# Patient Record
Sex: MALE | Race: WHITE | NOT HISPANIC OR LATINO | Employment: OTHER | URBAN - METROPOLITAN AREA
[De-identification: names, ages, dates, MRNs, and addresses within clinical notes are randomized per-mention and may not be internally consistent; named-entity substitution may affect disease eponyms.]

---

## 2017-03-30 DIAGNOSIS — Z87.898 HISTORY OF ELEVATED PSA: Primary | ICD-10-CM

## 2017-03-31 ENCOUNTER — OFFICE VISIT (OUTPATIENT)
Dept: UROLOGY | Facility: CLINIC | Age: 77
End: 2017-03-31
Payer: COMMERCIAL

## 2017-03-31 VITALS
HEART RATE: 60 BPM | WEIGHT: 203 LBS | HEIGHT: 71 IN | SYSTOLIC BLOOD PRESSURE: 120 MMHG | DIASTOLIC BLOOD PRESSURE: 72 MMHG | BODY MASS INDEX: 28.42 KG/M2

## 2017-03-31 DIAGNOSIS — Z87.898 HISTORY OF ELEVATED PSA: ICD-10-CM

## 2017-03-31 PROCEDURE — 99212 OFFICE O/P EST SF 10 MIN: CPT | Performed by: UROLOGY

## 2017-03-31 ASSESSMENT — PAIN SCALES - GENERAL: PAINLEVEL: NO PAIN (0)

## 2017-03-31 NOTE — NURSING NOTE
Chief Complaint   Patient presents with     Annual Prostate Exam     Patient is here for annual exam     Josesito Toure LPN 10:10 AM March 31, 2017

## 2017-03-31 NOTE — PROGRESS NOTES
Brandan is a 76-year-old male with BPH, no voiding discomfort, dysuria or hematuria. She he does have intermittent hematospermia, as in the past. KUB and MRI of the prostate were normal in 2014. Patient has a 90 cc prostate gland  No family history of prostate cancer  Other past medical history: Gout, hypertension  Medications: Baby aspirin, lisinopril, Maxzide  Allergies: Bacitracin, clindamycin, soap, triamcinolone  Review of systems: As above  Exam: Normal appearance, normal vital signs, alert and oriented, normocephalic, normal respirations, neuro grossly intact. Normal sphincter tone, no rectal mass or impaction, large and benign feeling prostate, seminal vesicles not palpable  Urinalysis: Patient could not leave sample  Assessment: BPH, chronic intermittent hematospermia  Plan: See me yearly for urinalysis, LEYDA. No PSA unless palpable abnormality. See me sooner if hematuria

## 2017-03-31 NOTE — LETTER
3/31/2017       RE: Brandan Evans  [address] [place] [address]     Dear Colleague,    Thank you for referring your patient, Brandan Evans, to the [facility] [place] at [facility]. Please see a copy of my visit note below.    Brandan is a 76-year-old male with BPH, no voiding discomfort, dysuria or hematuria. She he does have intermittent hematospermia, as in the past. KUB and MRI of the prostate were normal in 2014. Patient has a 90 cc prostate gland  No family history of prostate cancer  Other past medical history: Gout, hypertension  Medications: Baby aspirin, lisinopril, Maxzide  Allergies: Bacitracin, clindamycin, soap, triamcinolone  Review of systems: As above  Exam: Normal appearance, normal vital signs, alert and oriented, normocephalic, normal respirations, neuro grossly intact. Normal sphincter tone, no rectal mass or impaction, large and benign feeling prostate, seminal vesicles not palpable  Urinalysis: Patient could not leave sample  Assessment: BPH, chronic intermittent hematospermia  Plan: See me yearly for urinalysis, LEYDA. No PSA unless palpable abnormality. See me sooner if hematuria    Again, thank you for allowing me to participate in the care of your patient.      Sincerely,    Harshad Baumann MD

## 2017-03-31 NOTE — MR AVS SNAPSHOT
"              After Visit Summary   3/31/2017    Brandan Evans    MRN: [id number]           Patient Information     Date Of Birth          1940        Visit Information        Provider Department      3/31/2017 10:00 AM Harshad Baumann MD [facility] [place]        Today's Diagnoses     History of elevated PSA           Follow-ups after your visit        Follow-up notes from your care team     Return in about 1 year (around 3/31/2018) for Physical Exam.      Who to contact     If you have questions or need follow up information about today's clinic visit or your schedule please contact [facility] [place] directly at [phone].  Normal or non-critical lab and imaging results will be communicated to you by MyChart, letter or phone within 4 business days after the clinic has received the results. If you do not hear from us within 7 days, please contact the clinic through Nervana Systemshart or phone. If you have a critical or abnormal lab result, we will notify you by phone as soon as possible.  Submit refill requests through [employer] or call your pharmacy and they will forward the refill request to us. Please allow 3 business days for your refill to be completed.          Additional Information About Your Visit        MyChart Information     [employer] gives you secure access to your electronic health record. If you see a primary care provider, you can also send messages to your care team and make appointments. If you have questions, please call your primary care clinic.  If you do not have a primary care provider, please call [phone] and they will assist you.        Care EveryWhere ID     This is your Care EveryWhere ID. This could be used by other organizations to access your [place] medical records  [id number]        Your Vitals Were     Pulse Height BMI (Body Mass Index)             60 1.803 m (5' 11\") 28.31 kg/m2          Blood Pressure " from Last 3 Encounters:   03/31/17 120/72   08/18/15 (!) 168/92   01/15/14 124/77    Weight from Last 3 Encounters:   03/31/17 92.1 kg (203 lb)   01/15/14 91.6 kg (202 lb)   05/23/12 91.6 kg (201 lb 15.1 oz)              We Performed the Following     UA without Microscopic          Today's Medication Changes          These changes are accurate as of: 3/31/17 10:24 AM.  If you have any questions, ask your nurse or doctor.               Stop taking these medicines if you haven't already. Please contact your care team if you have questions.     INDOCIN PO   Stopped by:  Harshad Baumann MD           ketorolac 10 MG tablet   Commonly known as:  TORADOL   Stopped by:  Harshad Baumann MD           ondansetron 4 MG ODT tab   Commonly known as:  ZOFRAN ODT   Stopped by:  Harshad Baumann MD           oxyCODONE 5 MG IR tablet   Commonly known as:  ROXICODONE   Stopped by:  Harshad Baumann MD                    Primary Care Provider Office Phone # Fax #    Abad Anderson [phone]       [facility] [place] [address] [address]        Thank you!     Thank you for choosing [facility] [place]  for your care. Our goal is always to provide you with excellent care. Hearing back from our patients is one way we can continue to improve our services. Please take a few minutes to complete the written survey that you may receive in the mail after your visit with us. Thank you!             Your Updated Medication List - Protect others around you: Learn how to safely use, store and throw away your medicines at www.disposemymeds.org.          This list is accurate as of: 3/31/17 10:24 AM.  Always use your most recent med list.                   Brand Name Dispense Instructions for use    aspirin 81 MG tablet      Take  by mouth daily.       lisinopril 5 MG tablet    PRINIVIL/ZESTRIL    30 tablet    Take 1 tablet (5 mg) by mouth daily        triamterene-hydrochlorothiazide 37.5-25 MG per tablet    MAXZIDE-25     Take 1 tablet by mouth daily

## 2018-05-22 ENCOUNTER — SURGERY (OUTPATIENT)
Age: 78
End: 2018-05-22

## 2018-05-22 ENCOUNTER — HOSPITAL ENCOUNTER (OUTPATIENT)
Facility: CLINIC | Age: 78
Discharge: HOME | End: 2018-05-22
Attending: COLON & RECTAL SURGERY | Admitting: COLON & RECTAL SURGERY
Payer: MEDICARE

## 2018-05-22 VITALS
SYSTOLIC BLOOD PRESSURE: 110 MMHG | RESPIRATION RATE: 18 BRPM | DIASTOLIC BLOOD PRESSURE: 74 MMHG | OXYGEN SATURATION: 93 %

## 2018-05-22 LAB — COLONOSCOPY: NORMAL

## 2018-05-22 PROCEDURE — 88305 TISSUE EXAM BY PATHOLOGIST: CPT | Mod: 26 | Performed by: COLON & RECTAL SURGERY

## 2018-05-22 PROCEDURE — 45380 COLONOSCOPY AND BIOPSY: CPT | Performed by: COLON & RECTAL SURGERY

## 2018-05-22 PROCEDURE — 25000128 H RX IP 250 OP 636: Performed by: COLON & RECTAL SURGERY

## 2018-05-22 PROCEDURE — G0500 MOD SEDAT ENDO SERVICE >5YRS: HCPCS | Performed by: COLON & RECTAL SURGERY

## 2018-05-22 PROCEDURE — 88305 TISSUE EXAM BY PATHOLOGIST: CPT | Performed by: COLON & RECTAL SURGERY

## 2018-05-22 RX ORDER — ONDANSETRON 2 MG/ML
4 INJECTION INTRAMUSCULAR; INTRAVENOUS EVERY 6 HOURS PRN
Status: CANCELLED | OUTPATIENT
Start: 2018-05-22

## 2018-05-22 RX ORDER — FENTANYL CITRATE 50 UG/ML
INJECTION, SOLUTION INTRAMUSCULAR; INTRAVENOUS PRN
Status: DISCONTINUED | OUTPATIENT
Start: 2018-05-22 | End: 2018-05-22 | Stop reason: HOSPADM

## 2018-05-22 RX ORDER — NALOXONE HYDROCHLORIDE 0.4 MG/ML
.1-.4 INJECTION, SOLUTION INTRAMUSCULAR; INTRAVENOUS; SUBCUTANEOUS
Status: CANCELLED | OUTPATIENT
Start: 2018-05-22 | End: 2018-05-23

## 2018-05-22 RX ORDER — ONDANSETRON 2 MG/ML
4 INJECTION INTRAMUSCULAR; INTRAVENOUS
Status: DISCONTINUED | OUTPATIENT
Start: 2018-05-22 | End: 2018-05-22 | Stop reason: HOSPADM

## 2018-05-22 RX ORDER — ONDANSETRON 4 MG/1
4 TABLET, ORALLY DISINTEGRATING ORAL EVERY 6 HOURS PRN
Status: CANCELLED | OUTPATIENT
Start: 2018-05-22

## 2018-05-22 RX ORDER — LIDOCAINE 40 MG/G
CREAM TOPICAL
Status: DISCONTINUED | OUTPATIENT
Start: 2018-05-22 | End: 2018-05-22 | Stop reason: HOSPADM

## 2018-05-22 RX ORDER — FLUMAZENIL 0.1 MG/ML
0.2 INJECTION, SOLUTION INTRAVENOUS
Status: CANCELLED | OUTPATIENT
Start: 2018-05-22 | End: 2018-05-23

## 2018-05-22 RX ADMIN — FENTANYL CITRATE 100 MCG: 50 INJECTION, SOLUTION INTRAMUSCULAR; INTRAVENOUS at 13:56

## 2018-05-22 RX ADMIN — MIDAZOLAM 2 MG: 1 INJECTION INTRAMUSCULAR; INTRAVENOUS at 13:56

## 2018-05-22 NOTE — H&P
[facility]    History and Physical  Colon and Rectal Surgery     Date of Admission:  5/22/2018      Assessment & Plan   Brandan Evans is a 77 year old male who presents for colonoscopy.    Indication: screening  Plan for Colonoscopy with possible biopsy, possible polypectomy. We discussed the risks, benefits and alternatives and the patient wished to proceed.    The above has been forwarded to the consulting provider.      Hugh Pitt MD  [facility]  [phone]        Code Status   Full Code    Primary Care Physician   Abad Anedrson      History is obtained from the patient    History of Present Illness   Brandan Evans is a 77 year old male who presents for screening    Past Medical History    I have reviewed this patient's medical history and updated it with pertinent information if needed.   Past Medical History:   Diagnosis Date     Arrhythmia      Blood in semen      Complication of anesthesia [id number]    Patient said that jis pulse rate dropped during a surgical procedure     Decreased hearing     right ear.     Gout      Hypertension      Irregular heart beat      Renal disease     kidney stones       Past Surgical History   I have reviewed this patient's surgical history and updated it with pertinent information if needed.  Past Surgical History:   Procedure Laterality Date     CHOLECYSTECTOMY       COLONOSCOPY       EYE SURGERY      bilat catartact     LASER DIODE STAPEDECTOMY  4/11/2012    Procedure:LASER DIODE STAPEDECTOMY; Attempted Diode Laser Right Stapedectomy with laser standby **latex safe ; Surgeon:MECCA DAMON; Location:[facility] OR     LASER DIODE STAPEDECTOMY  5/23/2012    Procedure:LASER DIODE STAPEDECTOMY; Right Stapedectomy with Diode Laser  Latex Safe; Surgeon:MECCA DAMON; Location:[facility] OR       Prior to Admission Medications   Prior to Admission Medications   Prescriptions Last Dose Informant Patient  "Reported? Taking?   aspirin 81 MG tablet 5/22/2018  Yes Yes   Sig: Take  by mouth daily.   triamterene-hydrochlorothiazide (MAXZIDE-25) 37.5-25 MG per tablet 5/22/2018  Yes Yes   Sig: Take 1 tablet by mouth daily      Facility-Administered Medications: None     Allergies   Allergies   Allergen Reactions     Bacitracin      Other reaction(s): Contact Dermatitis     Clindamycin      Other reaction(s): GI Upset     Soap Itching     Other reaction(s): Contact Dermatitis  blisters and itching from \"technicare soap\" used for cleansing LEs 10/2006.     Triamcinolone      Can not use cream  Caused reddening       Social History   I have reviewed this patient's social history and updated it with pertinent information if needed. Brandan PELAEZ Cristina  reports that he has never smoked. He has never used smokeless tobacco. He reports that he drinks alcohol. He reports that he does not use illicit drugs.    Family History   I have reviewed this patient's family history and updated it with pertinent information if needed.   History reviewed. No pertinent family history.    Review of Systems   CONSTITUTIONAL: NEGATIVE for fever, chills, change in weight  ENT/MOUTH: NEGATIVE for ear, mouth and throat problems  RESP: NEGATIVE for significant cough or SOB  CV: NEGATIVE for chest pain, palpitations or peripheral edema    Physical Exam                      Vital Signs with Ranges     0 lbs 0 oz    Constitutional: awake, alert, cooperative, no apparent distress, and appears stated age  AIRWAY EXAM: Mallampatti Class I (visualization of the soft palate, fauces, uvula, anterior and posterior pillars)  Respiratory: No increased work of breathing, good air exchange, clear to auscultation bilaterally, no crackles or wheezing  Cardiovascular: Normal apical impulse, regular rate and rhythm, normal S1 and S2, no S3 or S4, and no murmur noted  ASA Class: 2 - Mild systemic disease                "

## 2018-05-22 NOTE — BRIEF OP NOTE
[facility] Brief Operative Note    Pre-operative diagnosis: SCREEN   Post-operative diagnosis small rectal polyp   Procedure: Procedure(s):  colonoscopy - Wound Class: II-Clean Contaminated   Surgeon(s): Surgeon(s) and Role:     * Hugh Pitt MD - Primary   Estimated blood loss: * No values recorded between 5/22/2018 12:00 AM and 5/22/2018  2:10 PM *    Specimens:   ID Type Source Tests Collected by Time Destination   A : rectal polyp biopsy Biopsy Large Intestine, Rectum SURGICAL PATHOLOGY EXAM Hugh Pitt MD 5/22/2018  2:09 PM       Findings: small rectal polyp  See provation procedure note in chart review.    Hugh Pitt MD  [facility]  [phone]

## 2018-05-23 LAB — COPATH REPORT: NORMAL

## 2018-10-18 DIAGNOSIS — R97.20 ELEVATED PROSTATE SPECIFIC ANTIGEN (PSA): Primary | ICD-10-CM

## 2018-10-24 ENCOUNTER — OFFICE VISIT (OUTPATIENT)
Dept: UROLOGY | Facility: CLINIC | Age: 78
End: 2018-10-24
Payer: COMMERCIAL

## 2018-10-24 VITALS
SYSTOLIC BLOOD PRESSURE: 110 MMHG | BODY MASS INDEX: 29.12 KG/M2 | HEIGHT: 71 IN | DIASTOLIC BLOOD PRESSURE: 74 MMHG | WEIGHT: 208 LBS

## 2018-10-24 DIAGNOSIS — R97.20 ELEVATED PROSTATE SPECIFIC ANTIGEN (PSA): ICD-10-CM

## 2018-10-24 PROCEDURE — 81003 URINALYSIS AUTO W/O SCOPE: CPT | Performed by: UROLOGY

## 2018-10-24 PROCEDURE — 99213 OFFICE O/P EST LOW 20 MIN: CPT | Performed by: UROLOGY

## 2018-10-24 ASSESSMENT — PAIN SCALES - GENERAL: PAINLEVEL: NO PAIN (0)

## 2018-10-24 NOTE — NURSING NOTE
Chief Complaint   Patient presents with     Hx of BPH     Pt is here for yearly urinalysis.      Ally Hdez, LUIS

## 2018-10-24 NOTE — MR AVS SNAPSHOT
"              After Visit Summary   10/24/2018    Brandan Evans    MRN: [id number]           Patient Information     Date Of Birth          1940        Visit Information        Provider Department      10/24/2018 1:00 PM Harshad Baumann MD [facility] [place]        Today's Diagnoses     Elevated prostate specific antigen (PSA)           Follow-ups after your visit        Follow-up notes from your care team     Return in about 1 year (around 10/24/2019) for Physical Exam.      Who to contact     If you have questions or need follow up information about today's clinic visit or your schedule please contact [facility] directly at [phone].  Normal or non-critical lab and imaging results will be communicated to you by InnerRewardshart, letter or phone within 4 business days after the clinic has received the results. If you do not hear from us within 7 days, please contact the clinic through InnerRewardshart or phone. If you have a critical or abnormal lab result, we will notify you by phone as soon as possible.  Submit refill requests through [employer] or call your pharmacy and they will forward the refill request to us. Please allow 3 business days for your refill to be completed.          Additional Information About Your Visit        MyChart Information     [employer] gives you secure access to your electronic health record. If you see a primary care provider, you can also send messages to your care team and make appointments. If you have questions, please call your primary care clinic.  If you do not have a primary care provider, please call [phone] and they will assist you.        Care EveryWhere ID     This is your Care EveryWhere ID. This could be used by other organizations to access your [place] medical records  [id number]        Your Vitals Were     Height BMI (Body Mass Index)                1.791 m (5' 10.5\") 29.42 kg/m2           " Blood Pressure from Last 3 Encounters:   10/24/18 110/74   05/22/18 110/74   03/31/17 120/72    Weight from Last 3 Encounters:   10/24/18 94.3 kg (208 lb)   03/31/17 92.1 kg (203 lb)   01/15/14 91.6 kg (202 lb)              We Performed the Following     UA without Microscopic        Primary Care Provider Office Phone # Fax #    Abad Anderson [phone]       [facility] [address] [facility] [address]        Equal Access to Services     [facility]: Hadii aad ku hadasho Soomaali, waaxda luqadaha, qaybta kaalmada adeegyada, [place]in [place] . So [facility] [phone].    ATENCIÓN: Si habla español, tiene a young disposición servicios gratuitos de asistencia lingüística. [facility] [phone].    We comply with applicable federal civil rights laws and [place] laws. We do not discriminate on the basis of race, color, national origin, age, disability, sex, sexual orientation, or gender identity.            Thank you!     Thank you for choosing [facility] [place]  for your care. Our goal is always to provide you with excellent care. Hearing back from our patients is one way we can continue to improve our services. Please take a few minutes to complete the written survey that you may receive in the mail after your visit with us. Thank you!             Your Updated Medication List - Protect others around you: Learn how to safely use, store and throw away your medicines at www.disposemymeds.org.          This list is accurate as of 10/24/18  1:32 PM.  Always use your most recent med list.                   Brand Name Dispense Instructions for use Diagnosis    aspirin 81 MG tablet      Take  by mouth daily.        triamterene-hydrochlorothiazide 37.5-25 MG per tablet    MAXZIDE-25     Take 1 tablet by mouth daily

## 2018-10-24 NOTE — PROGRESS NOTES
Brandan Evans is a 77-year-old gentleman who is accompanied by his wife today. He has a history of BPH and hematospermia. He has a normal urinalysis today and claims he is voiding well, no dysuria, hematuria or hematospermia.  Other past medical history: Recent cellulitis, gout, history of arrhythmia, hearing loss, hypertension, laser stapedectomy, eye surgery, cholecystectomy, colonoscopy, nonsmoker  No family history of prostate cancer  Medications: Low-dose aspirin, Maxzide  Allergies: Bacitracin, clindamycin, soap, triamcinolone  Exam: Alert and oriented, normal appearance, normal vital signs. Normocephalic, normal respirations, neuro grossly intact. Normal sphincter tone, no rectal mass or impaction, benign feeling prostate, normal seminal vesicles  Assessment: BPH, history of hematospermia, resolving cellulitis  Plan: See me yearly for urinalysis, postvoid residual, LEYDA. No PSA unless palpable abnormality

## 2018-10-26 LAB
ALBUMIN UR-MCNC: NEGATIVE MG/DL
APPEARANCE UR: CLEAR
BILIRUB UR QL STRIP: NEGATIVE
COLOR UR AUTO: YELLOW
GLUCOSE UR STRIP-MCNC: NEGATIVE MG/DL
HGB UR QL STRIP: NEGATIVE
KETONES UR STRIP-MCNC: NEGATIVE MG/DL
LEUKOCYTE ESTERASE UR QL STRIP: NEGATIVE
NITRATE UR QL: NEGATIVE
PH UR STRIP: 6 PH (ref 5–7)
SOURCE: NORMAL
SP GR UR STRIP: 1.01 (ref 1–1.03)
UROBILINOGEN UR STRIP-ACNC: 1 EU/DL (ref 0.2–1)

## 2019-06-14 ENCOUNTER — OFFICE VISIT (OUTPATIENT)
Dept: UROLOGY | Facility: CLINIC | Age: 79
End: 2019-06-14
Payer: COMMERCIAL

## 2019-06-14 VITALS
HEART RATE: 60 BPM | HEIGHT: 71 IN | OXYGEN SATURATION: 97 % | BODY MASS INDEX: 28.7 KG/M2 | SYSTOLIC BLOOD PRESSURE: 132 MMHG | WEIGHT: 205 LBS | DIASTOLIC BLOOD PRESSURE: 78 MMHG

## 2019-06-14 DIAGNOSIS — N42.81 PROSTATE PAIN: Primary | ICD-10-CM

## 2019-06-14 LAB
ALBUMIN UR-MCNC: NEGATIVE MG/DL
APPEARANCE UR: CLEAR
BILIRUB UR QL STRIP: NEGATIVE
COLOR UR AUTO: YELLOW
GLUCOSE UR STRIP-MCNC: NEGATIVE MG/DL
HGB UR QL STRIP: NEGATIVE
KETONES UR STRIP-MCNC: NEGATIVE MG/DL
LEUKOCYTE ESTERASE UR QL STRIP: NEGATIVE
NITRATE UR QL: NEGATIVE
PH UR STRIP: 5.5 PH (ref 5–7)
SOURCE: NORMAL
SP GR UR STRIP: 1.01 (ref 1–1.03)
UROBILINOGEN UR STRIP-ACNC: 0.2 EU/DL (ref 0.2–1)

## 2019-06-14 PROCEDURE — 81003 URINALYSIS AUTO W/O SCOPE: CPT | Performed by: UROLOGY

## 2019-06-14 PROCEDURE — 99213 OFFICE O/P EST LOW 20 MIN: CPT | Performed by: UROLOGY

## 2019-06-14 ASSESSMENT — MIFFLIN-ST. JEOR: SCORE: 1667

## 2019-06-14 ASSESSMENT — PAIN SCALES - GENERAL: PAINLEVEL: NO PAIN (0)

## 2019-06-14 NOTE — PROGRESS NOTES
Brnadan is a 78-year-old male with BPH and a history of hematospermia.  He has complaints of intermittent, mild bilateral groin discomfort.  Last night he had some pain above his right hip.  He has had no hematuria or dysuria.  He has a normal urinalysis and a 10 cc postvoid residual.  He has had no fever, chills, nausea or vomiting.  His bowels are moving regularly and he has had no blood in the stool or semen  Other past medical history: Arrhythmia, hearing loss, gout, hypertension, history of kidney stones, non-smoker  No family history of prostate cancer  Surgeries reviewed  Medications: Baby aspirin, Maxide  Allergies: Unchanged  Review of systems: As above  Exam: Alert and oriented, with spouse.  Normal vital signs, normal appearance.  Normal respirations, neuro grossly intact.  Small right inguinal hernia, no inguinal adenopathy.  Normal phallus, no balanitis, normal urethral meatus, normal scrotal skin, normal testes without masses or tenderness, normal epididymis and spermatic cords.  Normal sphincter tone, no rectal mass or impaction, benign prostate, normal seminal vesicles  Assessment: Normal exam except for small right inguinal hernia.  Reassured  Plan: See me yearly with urinalysis and digital rectal exam in June.  He will discuss symptoms with Dr. Anderson in the future and  may need referral to a general surgeon for his hernia

## 2019-06-14 NOTE — NURSING NOTE
"Chief Complaint   Patient presents with     Prostate Pain     pt states he has pn in low abd and pevis through to flank. wants to est. care here.       Patient Active Problem List   Diagnosis     Otosclerosis     Otosclerosis involving round window       Allergies   Allergen Reactions     Bacitracin      Other reaction(s): Contact Dermatitis     Clindamycin      Other reaction(s): GI Upset     Soap Itching     Other reaction(s): Contact Dermatitis  blisters and itching from \"technicare soap\" used for cleansing LEs 10/2006.     Triamcinolone      Can not use cream  Caused reddening       /78   Pulse 60   Ht 1.803 m (5' 11\")   Wt 93 kg (205 lb)   SpO2 97%   BMI 28.59 kg/m          Gilson Mcdaniel, EMT-B  6/14/2019         "

## 2019-06-14 NOTE — LETTER
6/14/2019       RE: Brandan Evans  [address] [place] [facility] [facility] [place] [address]     Dear Colleague,    Thank you for referring your patient, Brandan Evans, to the [facility] [place] at [facility]. Please see a copy of my visit note below.    Brandan is a 78-year-old male with BPH and a history of hematospermia.  He has complaints of intermittent, mild bilateral groin discomfort.  Last night he had some pain above his right hip.  He has had no hematuria or dysuria.  He has a normal urinalysis and a 10 cc postvoid residual.  He has had no fever, chills, nausea or vomiting.  His bowels are moving regularly and he has had no blood in the stool or semen  Other past medical history: Arrhythmia, hearing loss, gout, hypertension, history of kidney stones, non-smoker  No family history of prostate cancer  Surgeries reviewed  Medications: Baby aspirin, Maxide  Allergies: Unchanged  Review of systems: As above  Exam: Alert and oriented, with spouse.  Normal vital signs, normal appearance.  Normal respirations, neuro grossly intact.  Small right inguinal hernia, no inguinal adenopathy.  Normal phallus, no balanitis, normal urethral meatus, normal scrotal skin, normal testes without masses or tenderness, normal epididymis and spermatic cords.  Normal sphincter tone, no rectal mass or impaction, benign prostate, normal seminal vesicles  Assessment: Normal exam except for small right inguinal hernia.  Reassured  Plan: See me yearly with urinalysis and digital rectal exam in June.  He will discuss symptoms with Dr. Anderson in the future and  may need referral to a general surgeon for his hernia    Again, thank you for allowing me to participate in the care of your patient.      Sincerely,    Harshad Baumann MD

## 2019-06-17 ENCOUNTER — MYC MEDICAL ADVICE (OUTPATIENT)
Dept: UROLOGY | Facility: CLINIC | Age: 79
End: 2019-06-17

## 2019-06-18 NOTE — NURSING NOTE
Kamini from the business office has called him, new insurance cards were given to us and it will be resubmitted to his insurance.  Tommy SHEPHERD-[facility]  June 18, 2019 11:14 AM

## 2019-08-06 NOTE — LETTER
10/24/2018       RE: Brandan Evans  [address] [place] [address]     Dear Colleague,    Thank you for referring your patient, Brandan Evans, to the [facility] [place] at [facility]. Please see a copy of my visit note below.    Brandan Evans is a 77-year-old gentleman who is accompanied by his wife today. He has a history of BPH and hematospermia. He has a normal urinalysis today and claims he is voiding well, no dysuria, hematuria or hematospermia.  Other past medical history: Recent cellulitis, gout, history of arrhythmia, hearing loss, hypertension, laser stapedectomy, eye surgery, cholecystectomy, colonoscopy, nonsmoker  No family history of prostate cancer  Medications: Low-dose aspirin, Maxzide  Allergies: Bacitracin, clindamycin, soap, triamcinolone  Exam: Alert and oriented, normal appearance, normal vital signs. Normocephalic, normal respirations, neuro grossly intact. Normal sphincter tone, no rectal mass or impaction, benign feeling prostate, normal seminal vesicles  Assessment: BPH, history of hematospermia, resolving cellulitis  Plan: See me yearly for urinalysis, postvoid residual, LEYDA. No PSA unless palpable abnormality    Again, thank you for allowing me to participate in the care of your patient.      Sincerely,    Harshad Baumann MD       Birth Control Pills Pregnancy And Lactation Text: This medication should be avoided if pregnant and for the first 30 days post-partum.

## 2019-10-02 ENCOUNTER — HEALTH MAINTENANCE LETTER (OUTPATIENT)
Age: 79
End: 2019-10-02

## 2019-12-15 ENCOUNTER — HEALTH MAINTENANCE LETTER (OUTPATIENT)
Age: 79
End: 2019-12-15

## 2021-01-15 ENCOUNTER — HEALTH MAINTENANCE LETTER (OUTPATIENT)
Age: 81
End: 2021-01-15

## 2021-04-22 DIAGNOSIS — N42.81 PROSTATE PAIN: Primary | ICD-10-CM

## 2021-04-23 ENCOUNTER — OFFICE VISIT (OUTPATIENT)
Dept: UROLOGY | Facility: CLINIC | Age: 81
End: 2021-04-23
Payer: COMMERCIAL

## 2021-04-23 VITALS
DIASTOLIC BLOOD PRESSURE: 70 MMHG | BODY MASS INDEX: 26.68 KG/M2 | HEIGHT: 72 IN | OXYGEN SATURATION: 95 % | HEART RATE: 62 BPM | WEIGHT: 197 LBS | SYSTOLIC BLOOD PRESSURE: 136 MMHG

## 2021-04-23 DIAGNOSIS — N42.81 PROSTATE PAIN: ICD-10-CM

## 2021-04-23 LAB
ALBUMIN UR-MCNC: NEGATIVE MG/DL
APPEARANCE UR: CLEAR
BILIRUB UR QL STRIP: NEGATIVE
COLOR UR AUTO: YELLOW
GLUCOSE UR STRIP-MCNC: NEGATIVE MG/DL
HGB UR QL STRIP: NEGATIVE
KETONES UR STRIP-MCNC: NEGATIVE MG/DL
LEUKOCYTE ESTERASE UR QL STRIP: NEGATIVE
NITRATE UR QL: NEGATIVE
PH UR STRIP: 6 PH (ref 5–7)
SOURCE: NORMAL
SP GR UR STRIP: 1.02 (ref 1–1.03)
UROBILINOGEN UR STRIP-ACNC: 0.2 EU/DL (ref 0.2–1)

## 2021-04-23 PROCEDURE — 81003 URINALYSIS AUTO W/O SCOPE: CPT | Performed by: UROLOGY

## 2021-04-23 PROCEDURE — 99212 OFFICE O/P EST SF 10 MIN: CPT | Performed by: UROLOGY

## 2021-04-23 ASSESSMENT — MIFFLIN-ST. JEOR: SCORE: 1628.65

## 2021-04-23 ASSESSMENT — PAIN SCALES - GENERAL: PAINLEVEL: NO PAIN (0)

## 2021-04-23 NOTE — PROGRESS NOTES
Mr. Evans is a very pleasant 80-year-old male.  He has had 2 general surgery opinions who suggested he wait to fix his right inguinal hernia.  His urination is comfortable and he has had no dysuria or hematuria.  There is no family history of prostate cancer  Other past medical history: Arrhythmia, blood in semen, hearing loss, gout, hypertension, history of urolithiasis, non-smoker  Surgeries reviewed  Medications: Baby aspirin, Maxide  Allergies: Bacitracin, clindamycin, soap, triamcinolone  Review of systems: As above.  Feels well  Exam: Alert and oriented, normal appearance, normal vital signs.  Normal respirations, neuro grossly intact.  Normal sphincter tone, no rectal mass or impaction, benign and symmetric prostate, normal seminal vesicles  Urinalysis normal  Assessment: BPH, right inguinal hernia  Plan: See partner yearly for digital rectal exam, urinalysis.  No PSA unless palpable abnormality

## 2021-04-23 NOTE — NURSING NOTE
"Chief Complaint   Patient presents with     Benign Prostatic Hypertrophy     Patient here today for a follow up       Blood pressure 136/70, pulse 62, height 1.816 m (5' 11.5\"), weight 89.4 kg (197 lb), SpO2 95 %. Body mass index is 27.09 kg/m .    Patient Active Problem List   Diagnosis     Otosclerosis     Otosclerosis involving round window       Allergies   Allergen Reactions     Bacitracin      Other reaction(s): Contact Dermatitis     Clindamycin      Other reaction(s): GI Upset     Soap Itching     Other reaction(s): Contact Dermatitis  blisters and itching from \"technicare soap\" used for cleansing LEs 10/2006.     Triamcinolone      Can not use cream  Caused reddening       Current Outpatient Medications   Medication Sig Dispense Refill     aspirin 81 MG tablet Take  by mouth daily.       triamterene-hydrochlorothiazide (MAXZIDE-25) 37.5-25 MG per tablet Take 1 tablet by mouth daily         Social History     Tobacco Use     Smoking status: Never Smoker     Smokeless tobacco: Never Used   Substance Use Topics     Alcohol use: Yes     Comment: Rare     Drug use: No       UA RESULTS:  Recent Labs   Lab Test 04/23/21  0838 01/15/14  0828 01/15/14  0828   COLOR Yellow   < > Yellow   APPEARANCE Clear   < > Clear   URINEGLC Negative   < > Negative   URINEBILI Negative   < > Negative   URINEKETONE Negative   < > 40*   SG 1.025   < > 1.016   UBLD Negative   < > Moderate*   URINEPH 6.0   < > 6.5   PROTEIN Negative   < > 10*   UROBILINOGEN 0.2   < >  --    NITRITE Negative   < > Negative   LEUKEST Negative   < > Negative   RBCU  --   --  51*   WBCU  --   --  1    < > = values in this interval not displayed.         Marian Ceron, [facility]  4/23/2021  8:57 AM       "

## 2021-04-23 NOTE — LETTER
4/23/2021       RE: Brandan Evans  [address] [place] [address]     Dear Colleague,    Thank you for referring your patient, Brandan Evans, to the [facility] at [facility]. Please see a copy of my visit note below.    Mr. Evans is a very pleasant 80-year-old male.  He has had 2 general surgery opinions who suggested he wait to fix his right inguinal hernia.  His urination is comfortable and he has had no dysuria or hematuria.  There is no family history of prostate cancer  Other past medical history: Arrhythmia, blood in semen, hearing loss, gout, hypertension, history of urolithiasis, non-smoker  Surgeries reviewed  Medications: Baby aspirin, Maxide  Allergies: Bacitracin, clindamycin, soap, triamcinolone  Review of systems: As above.  Feels well  Exam: Alert and oriented, normal appearance, normal vital signs.  Normal respirations, neuro grossly intact.  Normal sphincter tone, no rectal mass or impaction, benign and symmetric prostate, normal seminal vesicles  Urinalysis normal  Assessment: BPH, right inguinal hernia  Plan: See partner yearly for digital rectal exam, urinalysis.  No PSA unless palpable abnormality    Again, thank you for allowing me to participate in the care of your patient.      Sincerely,    Harshad Baumann MD

## 2021-08-20 ENCOUNTER — TRANSFERRED RECORDS (OUTPATIENT)
Dept: HEALTH INFORMATION MANAGEMENT | Facility: CLINIC | Age: 81
End: 2021-08-20

## 2021-08-22 ENCOUNTER — HOSPITAL ENCOUNTER (EMERGENCY)
Facility: CLINIC | Age: 81
Discharge: HOME | End: 2021-08-22
Attending: EMERGENCY MEDICINE | Admitting: EMERGENCY MEDICINE
Payer: COMMERCIAL

## 2021-08-22 VITALS
OXYGEN SATURATION: 96 % | HEART RATE: 61 BPM | WEIGHT: 206 LBS | SYSTOLIC BLOOD PRESSURE: 155 MMHG | BODY MASS INDEX: 28.33 KG/M2 | RESPIRATION RATE: 14 BRPM | DIASTOLIC BLOOD PRESSURE: 114 MMHG | TEMPERATURE: 97.9 F

## 2021-08-22 DIAGNOSIS — I10 HYPERTENSION, UNSPECIFIED TYPE: ICD-10-CM

## 2021-08-22 DIAGNOSIS — I48.91 ATRIAL FIBRILLATION, UNSPECIFIED TYPE (H): ICD-10-CM

## 2021-08-22 LAB
ALBUMIN SERPL-MCNC: 3.4 G/DL (ref 3.4–5)
ALP SERPL-CCNC: 164 U/L (ref 40–150)
ALT SERPL W P-5'-P-CCNC: 33 U/L (ref 0–70)
ANION GAP SERPL CALCULATED.3IONS-SCNC: 1 MMOL/L (ref 3–14)
AST SERPL W P-5'-P-CCNC: 21 U/L (ref 0–45)
BASOPHILS # BLD AUTO: 0.2 10E3/UL (ref 0–0.2)
BASOPHILS NFR BLD AUTO: 2 %
BILIRUB SERPL-MCNC: 0.5 MG/DL (ref 0.2–1.3)
BUN SERPL-MCNC: 14 MG/DL (ref 7–30)
CALCIUM SERPL-MCNC: 8.5 MG/DL (ref 8.5–10.1)
CHLORIDE BLD-SCNC: 106 MMOL/L (ref 94–109)
CO2 SERPL-SCNC: 31 MMOL/L (ref 20–32)
CREAT SERPL-MCNC: 0.96 MG/DL (ref 0.66–1.25)
EOSINOPHIL # BLD AUTO: 2.4 10E3/UL (ref 0–0.7)
EOSINOPHIL NFR BLD AUTO: 24 %
ERYTHROCYTE [DISTWIDTH] IN BLOOD BY AUTOMATED COUNT: 13.4 % (ref 10–15)
GFR SERPL CREATININE-BSD FRML MDRD: 74 ML/MIN/1.73M2
GLUCOSE BLD-MCNC: 102 MG/DL (ref 70–99)
HCT VFR BLD AUTO: 47.6 % (ref 40–53)
HGB BLD-MCNC: 15.3 G/DL (ref 13.3–17.7)
IMM GRANULOCYTES # BLD: 0 10E3/UL
IMM GRANULOCYTES NFR BLD: 0 %
LYMPHOCYTES # BLD AUTO: 1.2 10E3/UL (ref 0.8–5.3)
LYMPHOCYTES NFR BLD AUTO: 12 %
MCH RBC QN AUTO: 28.6 PG (ref 26.5–33)
MCHC RBC AUTO-ENTMCNC: 32.1 G/DL (ref 31.5–36.5)
MCV RBC AUTO: 89 FL (ref 78–100)
MONOCYTES # BLD AUTO: 0.9 10E3/UL (ref 0–1.3)
MONOCYTES NFR BLD AUTO: 9 %
NEUTROPHILS # BLD AUTO: 5.3 10E3/UL (ref 1.6–8.3)
NEUTROPHILS NFR BLD AUTO: 53 %
NRBC # BLD AUTO: 0 10E3/UL
NRBC BLD AUTO-RTO: 0 /100
NT-PROBNP SERPL-MCNC: 1271 PG/ML (ref 0–1800)
PLATELET # BLD AUTO: 235 10E3/UL (ref 150–450)
POTASSIUM BLD-SCNC: 3.8 MMOL/L (ref 3.4–5.3)
PROT SERPL-MCNC: 6.8 G/DL (ref 6.8–8.8)
RBC # BLD AUTO: 5.35 10E6/UL (ref 4.4–5.9)
SODIUM SERPL-SCNC: 138 MMOL/L (ref 133–144)
TROPONIN I SERPL-MCNC: <0.015 UG/L (ref 0–0.04)
WBC # BLD AUTO: 10 10E3/UL (ref 4–11)

## 2021-08-22 PROCEDURE — 99284 EMERGENCY DEPT VISIT MOD MDM: CPT

## 2021-08-22 PROCEDURE — 82040 ASSAY OF SERUM ALBUMIN: CPT | Performed by: EMERGENCY MEDICINE

## 2021-08-22 PROCEDURE — 250N000013 HC RX MED GY IP 250 OP 250 PS 637: Performed by: EMERGENCY MEDICINE

## 2021-08-22 PROCEDURE — 83880 ASSAY OF NATRIURETIC PEPTIDE: CPT | Performed by: EMERGENCY MEDICINE

## 2021-08-22 PROCEDURE — 84484 ASSAY OF TROPONIN QUANT: CPT | Performed by: EMERGENCY MEDICINE

## 2021-08-22 PROCEDURE — 93005 ELECTROCARDIOGRAM TRACING: CPT

## 2021-08-22 PROCEDURE — 36415 COLL VENOUS BLD VENIPUNCTURE: CPT | Performed by: EMERGENCY MEDICINE

## 2021-08-22 PROCEDURE — 85004 AUTOMATED DIFF WBC COUNT: CPT | Performed by: EMERGENCY MEDICINE

## 2021-08-22 PROCEDURE — 85025 COMPLETE CBC W/AUTO DIFF WBC: CPT | Performed by: EMERGENCY MEDICINE

## 2021-08-22 RX ORDER — METOPROLOL TARTRATE 25 MG/1
25 TABLET, FILM COATED ORAL ONCE
Status: COMPLETED | OUTPATIENT
Start: 2021-08-22 | End: 2021-08-22

## 2021-08-22 RX ORDER — METOPROLOL TARTRATE 25 MG/1
25 TABLET, FILM COATED ORAL 2 TIMES DAILY
Qty: 60 TABLET | Refills: 0 | Status: SHIPPED | OUTPATIENT
Start: 2021-08-22 | End: 2021-10-25

## 2021-08-22 RX ADMIN — METOPROLOL TARTRATE 25 MG: 25 TABLET, FILM COATED ORAL at 21:45

## 2021-08-22 RX ADMIN — APIXABAN 5 MG: 5 TABLET, FILM COATED ORAL at 21:45

## 2021-08-22 ASSESSMENT — ENCOUNTER SYMPTOMS
SHORTNESS OF BREATH: 0
HEADACHES: 0
FEVER: 0

## 2021-08-23 LAB
ATRIAL RATE - MUSE: 111 BPM
DIASTOLIC BLOOD PRESSURE - MUSE: NORMAL MMHG
INTERPRETATION ECG - MUSE: NORMAL
P AXIS - MUSE: NORMAL DEGREES
PR INTERVAL - MUSE: NORMAL MS
QRS DURATION - MUSE: 108 MS
QT - MUSE: 398 MS
QTC - MUSE: 444 MS
R AXIS - MUSE: -50 DEGREES
SYSTOLIC BLOOD PRESSURE - MUSE: NORMAL MMHG
T AXIS - MUSE: 100 DEGREES
VENTRICULAR RATE- MUSE: 75 BPM

## 2021-08-23 NOTE — ED TRIAGE NOTES
Pt was seen at an urgent care on Thursday for his blood pressure;  States that he is to be seen on Friday for a stress test.  Pt states that his blood pressure is still high.

## 2021-08-23 NOTE — ED PROVIDER NOTES
"  History   Chief Complaint:  Hypertension       HPI   Brandan Evans is a 80 year old male with history of A-fib and hypertension who presents with hypertension. Patient was recently taken off of his triamterene and started on Lasix. He was seen at Urgent Care on Friday for high blood pressure. He measured a blood pressure at home of 188/100 and he is usually at 125/75. He was sent home and has out patient stress test scheduled for 5 days from now. Today, he notes his blood pressure is still high and he had a small \"pinch\" in his chest. He took an aspirin and it has since gone away. He denies any other symptoms including shortness of breath, headache, and fever. He has been vaccinated for covid.       Review of Systems   Constitutional: Negative for fever.   Respiratory: Negative for shortness of breath.    Cardiovascular: Positive for chest pain.   Neurological: Negative for headaches.   All other systems reviewed and are negative.        Allergies:  Bacitracin  Clindamycin  Triamcinolone    Medications:  Lasix  ASA  Indocin   Zyloprim   Klor-Con     Past Medical History:    A-fib   Gout  Hypertension   Renal disease   Kidney stones      Past Surgical History:    Choloecystectomy   Colonoscopy   Stapedectomy   Bilateral cataracts     Social History:  Patient presents to the ED with his wife.    Physical Exam     Patient Vitals for the past 24 hrs:   BP Temp Temp src Pulse Resp SpO2 Weight   08/22/21 2145 (!) 155/114 -- -- 61 -- -- --   08/22/21 2100 (!) 173/110 -- -- 60 13 98 % --   08/22/21 2045 (!) 166/98 -- -- 61 -- 98 % --   08/22/21 1947 (!) 158/92 97.9  F (36.6  C) Temporal 60 16 98 % 93.4 kg (206 lb)       Physical Exam  General: Alert, interactive in mild distress  Head:  Scalp is atraumatic  Eyes:  The pupils are equal, round, and reactive to light    EOM's intact    No scleral icterus  ENT:      Nose:  The external nose is normal  Ears:  External ears are normal  Mouth/Throat: The oropharynx is " normal    Mucus membranes are moist      Neck:  Normal range of motion.      There is no rigidity.    Trachea is in the midline       CV:  Irregularly regular rate and rhythm.     No murmur   Resp:  Breath sounds are clear bilaterally    Non-labored, no retractions or accessory muscle use      GI:  Abdomen is soft, no distension, no tenderness.       MS:  Normal strength in all 4 extremities    Trace pedal edema to bilateral lower extremities.   Skin:  Warm and dry, No rash or lesions noted.  Neuro:  Strength 5/5 x4.  Sensation intact  In all 4 extremities.      GCS: 15  Psych:  Awake. Alert.  Normal affect.      Appropriate interactions.      Emergency Department Course     ECG:  ECG taken at 2056, ECG read at 2110  Atrial Fibrillation. Left axis deviation. Incomplete RBBB. Abnormal QRS-T angle, consider primary T wave abnormality.  Rate 75 bpm. OR interval * ms. QRS duration 108 ms. QT/QTc 398/444 ms. P-R-T axes * -50 100.     Laboratory:  CBC: WBC: 10.0, HGB: 15.3, PLT: 235  CMP: Glucose 102 (H), Anion Gap: 1 (low), Alkaline Phosphatase: 164 (H), o/w WNL (Creatinine: 0.96)  Troponin (Collected 1953): <0.015  BNP: 1271    Emergency Department Course:    Reviewed:  nursing notes, vitals, past medical history and care everywhere    Assessments:    2045 Initial assessment     2120 I rechecked the patient and discussed the results of their workup thus far.     Interventions:  2145 Lopressor 25 mg PO  2145 Eliquis 5 mg PO    Disposition:  The patient was discharged to home.       SALPQ-3-Bqog Score  (calculator)  Background  Calculates overall risk of atrial fibrillation related CVA based on 7 factors: Age>=65-75, CHF, Htn, CVA/TIA, DM, vascular disease, female  Data  80 year old year old male  has Otosclerosis and Otosclerosis involving round window on their problem list.  Criteria   Of possible 6 points for 5 possible items  1 point: Hypertension  2 point: Age over 75 years (1 point over 65  years)    Interpretation  PGALO-2-Xgdq Score: 3  CHADS Score 2+ (CVA risk >5% per year): Warfarin with goal INR 2.0 to 3.0      Impression & Plan     Medical Decision Making:  Following presentation history and physical examination were performed, previous records from August 20 were reviewed.  Patient's got new onset atrial fibrillation as well as hypertension.  He is hemodynamically stable there is no signs of endorgan damage or more concerning illness.  His [id number] score is 3 and he will be initiated on Eliquis.  I have also initiated on metoprolol for rate control.  I have ordered an outpatient echocardiogram as well as follow-up with an electrophysiologist in believe he can safely be discharged home.  He has had no chest pain or shortness of breath to suggest acute coronary syndrome there is no signs of congestive heart failure.  Patient was in agreement this plan all of his questions were answered as were his wife and he was subsequently discharged home.      Diagnosis:    ICD-10-CM    1. Atrial fibrillation, unspecified type (H)  I48.91 Follow-Up with Electrophysiologist     Echocardiogram Complete with Bubble   2. Hypertension, unspecified type  I10 Follow-Up with Electrophysiologist     Echocardiogram Complete with Bubble       Discharge Medications:  New Prescriptions    APIXABAN ANTICOAGULANT (ELIQUIS ANTICOAGULANT) 5 MG TABLET    Take 1 tablet (5 mg) by mouth 2 times daily    METOPROLOL TARTRATE (LOPRESSOR) 25 MG TABLET    Take 1 tablet (25 mg) by mouth 2 times daily       Scribe Disclosure:  I, Hugo Garcia, am serving as a scribe at 8:34 PM on 8/22/2021 to document services personally performed by Jose Blackwell MD based on my observations and the provider's statements to me.              Jose Blackwell MD  08/22/21 0263

## 2021-08-24 ENCOUNTER — TELEPHONE (OUTPATIENT)
Dept: CARDIOLOGY | Facility: CLINIC | Age: 81
End: 2021-08-24

## 2021-08-24 NOTE — TELEPHONE ENCOUNTER
Faxed request for EKG tracings dated 8/18/2015 and 8/20/2021 for upcoming appt with Dr Melendez on 8/26.  CORONA Morrison

## 2021-08-25 ENCOUNTER — HOSPITAL ENCOUNTER (OUTPATIENT)
Dept: CARDIOLOGY | Facility: CLINIC | Age: 81
Discharge: HOME | End: 2021-08-25
Attending: EMERGENCY MEDICINE | Admitting: EMERGENCY MEDICINE
Payer: COMMERCIAL

## 2021-08-25 DIAGNOSIS — I10 HYPERTENSION, UNSPECIFIED TYPE: ICD-10-CM

## 2021-08-25 DIAGNOSIS — I48.91 ATRIAL FIBRILLATION, UNSPECIFIED TYPE (H): ICD-10-CM

## 2021-08-25 LAB — BI-PLANE LVEF ECHO: NORMAL

## 2021-08-25 PROCEDURE — 255N000002 HC RX 255 OP 636: Performed by: EMERGENCY MEDICINE

## 2021-08-25 PROCEDURE — 93306 TTE W/DOPPLER COMPLETE: CPT | Mod: 26 | Performed by: INTERNAL MEDICINE

## 2021-08-25 PROCEDURE — 999N000208 ECHOCARDIOGRAM COMPLETE

## 2021-08-25 RX ADMIN — HUMAN ALBUMIN MICROSPHERES AND PERFLUTREN 9 ML: 10; .22 INJECTION, SOLUTION INTRAVENOUS at 13:50

## 2021-08-26 ENCOUNTER — OFFICE VISIT (OUTPATIENT)
Dept: CARDIOLOGY | Facility: CLINIC | Age: 81
End: 2021-08-26
Attending: EMERGENCY MEDICINE
Payer: COMMERCIAL

## 2021-08-26 ENCOUNTER — TELEPHONE (OUTPATIENT)
Dept: CARDIOLOGY | Facility: CLINIC | Age: 81
End: 2021-08-26

## 2021-08-26 VITALS
HEART RATE: 67 BPM | WEIGHT: 208.5 LBS | DIASTOLIC BLOOD PRESSURE: 87 MMHG | SYSTOLIC BLOOD PRESSURE: 144 MMHG | BODY MASS INDEX: 29.19 KG/M2 | HEIGHT: 71 IN

## 2021-08-26 DIAGNOSIS — I10 HYPERTENSION, UNSPECIFIED TYPE: ICD-10-CM

## 2021-08-26 DIAGNOSIS — I48.21 PERMANENT ATRIAL FIBRILLATION (H): Primary | ICD-10-CM

## 2021-08-26 DIAGNOSIS — I48.0 PAROXYSMAL ATRIAL FIBRILLATION (H): Primary | ICD-10-CM

## 2021-08-26 PROCEDURE — 93000 ELECTROCARDIOGRAM COMPLETE: CPT | Performed by: INTERNAL MEDICINE

## 2021-08-26 PROCEDURE — 99204 OFFICE O/P NEW MOD 45 MIN: CPT | Performed by: INTERNAL MEDICINE

## 2021-08-26 RX ORDER — FUROSEMIDE 40 MG
40 TABLET ORAL DAILY
COMMUNITY

## 2021-08-26 RX ORDER — ALLOPURINOL 100 MG/1
100 TABLET ORAL DAILY
COMMUNITY

## 2021-08-26 RX ORDER — WARFARIN SODIUM 5 MG/1
TABLET ORAL
Qty: 45 TABLET | Refills: 3 | Status: SHIPPED | OUTPATIENT
Start: 2021-08-26

## 2021-08-26 RX ORDER — LOSARTAN POTASSIUM 25 MG/1
25 TABLET ORAL DAILY
Qty: 30 TABLET | Refills: 4 | Status: SHIPPED | OUTPATIENT
Start: 2021-08-26 | End: 2021-09-24

## 2021-08-26 ASSESSMENT — MIFFLIN-ST. JEOR: SCORE: 1672.88

## 2021-08-26 NOTE — LETTER
8/26/2021    Abad SHEPHERD Flaata  [address]  [facility] [facility] [address]    RE: Brandan Evans       Dear Colleague,    I had the pleasure of seeing Brandan Evans in the [facility].    HPI and Plan:   See dictation [id number]    Orders Placed This Encounter   Procedures     Basic metabolic panel     Follow-Up with Cardiac Advanced Practice Provider     EKG 12-lead complete w/read - Clinics (performed today)     Holter Monitor 24 hour Adult Pediatric     Holter Monitor 24 hour Adult Pediatric       Orders Placed This Encounter   Medications     furosemide (LASIX) 40 MG tablet     Sig: Take 40 mg by mouth daily     allopurinol (ZYLOPRIM) 100 MG tablet     Sig: Take 100 mg by mouth daily     warfarin ANTICOAGULANT (COUMADIN) 5 MG tablet     Sig: Start with 1 tab daily, thereafter use as directed by INR Clinic     Dispense:  45 tablet     Refill:  3     losartan (COZAAR) 25 MG tablet     Sig: Take 1 tablet (25 mg) by mouth daily     Dispense:  30 tablet     Refill:  4       Medications Discontinued During This Encounter   Medication Reason     triamterene-hydrochlorothiazide (MAXZIDE-25) 37.5-25 MG per tablet Stopped by Patient     apixaban ANTICOAGULANT (ELIQUIS ANTICOAGULANT) 5 MG tablet Not filled/taken by Patient         Encounter Diagnoses   Name Primary?     Permanent atrial fibrillation (H) Yes     Hypertension, unspecified type        CURRENT MEDICATIONS:  Current Outpatient Medications   Medication Sig Dispense Refill     allopurinol (ZYLOPRIM) 100 MG tablet Take 100 mg by mouth daily       furosemide (LASIX) 40 MG tablet Take 40 mg by mouth daily       losartan (COZAAR) 25 MG tablet Take 1 tablet (25 mg) by mouth daily 30 tablet 4     metoprolol tartrate (LOPRESSOR) 25 MG tablet Take 1 tablet (25 mg) by mouth 2 times daily 60 tablet 0     warfarin ANTICOAGULANT (COUMADIN) 5 MG tablet Start with 1 tab daily, thereafter use as directed by INR Clinic  "45 tablet 3       ALLERGIES     Allergies   Allergen Reactions     Bacitracin      Other reaction(s): Contact Dermatitis     Clindamycin      Other reaction(s): GI Upset     Soap Itching     Other reaction(s): Contact Dermatitis  blisters and itching from \"technicare soap\" used for cleansing LEs 10/2006.     Triamcinolone      Can not use cream  Caused reddening       PAST MEDICAL HISTORY:  Past Medical History:   Diagnosis Date     Arrhythmia      Blood in semen      Complication of anesthesia [id number]    Patient said that jis pulse rate dropped during a surgical procedure     Decreased hearing     right ear.     Gout      Hypertension      Irregular heart beat      Renal disease     kidney stones       PAST SURGICAL HISTORY:  Past Surgical History:   Procedure Laterality Date     CHOLECYSTECTOMY       COLONOSCOPY       COLONOSCOPY N/A 5/22/2018    Procedure: COMBINED COLONOSCOPY, SINGLE OR MULTIPLE BIOPSY/POLYPECTOMY BY BIOPSY;  colonoscopy;  Surgeon: Hugh Pitt MD;  Location:  GI     EYE SURGERY      bilat catartact     LASER DIODE STAPEDECTOMY  4/11/2012    Procedure:LASER DIODE STAPEDECTOMY; Attempted Diode Laser Right Stapedectomy with laser standby **latex safe ; Surgeon:MECCA DAMON; Location: OR     LASER DIODE STAPEDECTOMY  5/23/2012    Procedure:LASER DIODE STAPEDECTOMY; Right Stapedectomy with Diode Laser  Latex Safe; Surgeon:MECCA DAMON; Location: OR       FAMILY HISTORY:  No family history on file.    SOCIAL HISTORY:  Social History     Socioeconomic History     Marital status:      Spouse name: None     Number of children: None     Years of education: None     Highest education level: None   Occupational History     None   Tobacco Use     Smoking status: Never Smoker     Smokeless tobacco: Never Used   Substance and Sexual Activity     Alcohol use: Yes     Comment: Rare     Drug use: No     Sexual activity: None   Other Topics Concern     Parent/sibling " w/ CABG, MI or angioplasty before 65F 55M? Not Asked   Social History Narrative     None     Social Determinants of Health     Financial Resource Strain:      Difficulty of Paying Living Expenses:    Food Insecurity:      Worried About Running Out of Food in the Last Year:      Ran Out of Food in the Last Year:    Transportation Needs:      Lack of Transportation (Medical):      Lack of Transportation (Non-Medical):    Physical Activity:      Days of Exercise per Week:      Minutes of Exercise per Session:    Stress:      Feeling of Stress :    Social Connections:      Frequency of Communication with Friends and Family:      Frequency of Social Gatherings with Friends and Family:      Attends Jew Services:      Active Member of Clubs or Organizations:      Attends Club or Organization Meetings:      Marital Status:    Intimate Partner Violence:      Fear of Current or Ex-Partner:      Emotionally Abused:      Physically Abused:      Sexually Abused:        Review of Systems:  Skin:  Negative       Eyes:  Positive for glasses    ENT:  Negative      Respiratory:  Negative       Cardiovascular:  Negative;palpitations;chest pain;syncope or near-syncope;cyanosis;fatigue;lightheadedness;dizziness      Gastroenterology: Negative      Genitourinary:  not assessed      Musculoskeletal:  Negative      Neurologic:  Negative      Psychiatric:  Negative      Heme/Lymph/Imm:  Negative      Endocrine:  Negative                        Thank you for allowing me to participate in the care of your patient.      Sincerely,     Heena Melendez MD     [facility]  cc:   Jose Blackwell MD  [facility]  [address]  [place] [address]

## 2021-08-26 NOTE — TELEPHONE ENCOUNTER
8/26/21 Call recd from INR RN- Yennifer Sanchez ( [place]) requesting faxed order for new INR referral be faxed to them  at [phone].  Order placed, fax sent  KHerroRN 350 pm

## 2021-08-26 NOTE — PATIENT INSTRUCTIONS
1.  Start warfarin 5 mg every evening to prevent blood clots and stroke related to Afib.  You will need a blood test called INR in your doctor's office next Monday, August 30.  Please call to arrange.  2.  Your blood pressure has been high lately.  Start a new blood pressure medication called losartan 25 mg, take every morning.  3.  You will need a blood test in 7 to 10 days to make sure your potassium level is okay after you start the new medication.  4.  Consider wearing knee-high support stockings every morning to minimize swelling in your feet.  5.  Cardiac monitor in 10 days.  6.  We will make a follow-up appointment here in approximately 1 month.

## 2021-08-26 NOTE — PROGRESS NOTES
HPI and Plan:   See dictation [id number]    Orders Placed This Encounter   Procedures     Basic metabolic panel     Follow-Up with Cardiac Advanced Practice Provider     EKG 12-lead complete w/read - Clinics (performed today)     Holter Monitor 24 hour Adult Pediatric     Holter Monitor 24 hour Adult Pediatric       Orders Placed This Encounter   Medications     furosemide (LASIX) 40 MG tablet     Sig: Take 40 mg by mouth daily     allopurinol (ZYLOPRIM) 100 MG tablet     Sig: Take 100 mg by mouth daily     warfarin ANTICOAGULANT (COUMADIN) 5 MG tablet     Sig: Start with 1 tab daily, thereafter use as directed by INR Clinic     Dispense:  45 tablet     Refill:  3     losartan (COZAAR) 25 MG tablet     Sig: Take 1 tablet (25 mg) by mouth daily     Dispense:  30 tablet     Refill:  4       Medications Discontinued During This Encounter   Medication Reason     triamterene-hydrochlorothiazide (MAXZIDE-25) 37.5-25 MG per tablet Stopped by Patient     apixaban ANTICOAGULANT (ELIQUIS ANTICOAGULANT) 5 MG tablet Not filled/taken by Patient         Encounter Diagnoses   Name Primary?     Permanent atrial fibrillation (H) Yes     Hypertension, unspecified type        CURRENT MEDICATIONS:  Current Outpatient Medications   Medication Sig Dispense Refill     allopurinol (ZYLOPRIM) 100 MG tablet Take 100 mg by mouth daily       furosemide (LASIX) 40 MG tablet Take 40 mg by mouth daily       losartan (COZAAR) 25 MG tablet Take 1 tablet (25 mg) by mouth daily 30 tablet 4     metoprolol tartrate (LOPRESSOR) 25 MG tablet Take 1 tablet (25 mg) by mouth 2 times daily 60 tablet 0     warfarin ANTICOAGULANT (COUMADIN) 5 MG tablet Start with 1 tab daily, thereafter use as directed by INR Clinic 45 tablet 3       ALLERGIES     Allergies   Allergen Reactions     Bacitracin      Other reaction(s): Contact Dermatitis     Clindamycin      Other reaction(s): GI Upset     Soap Itching     Other reaction(s): Contact Dermatitis  blisters and itching  "from \"technicare soap\" used for cleansing LEs 10/2006.     Triamcinolone      Can not use cream  Caused reddening       PAST MEDICAL HISTORY:  Past Medical History:   Diagnosis Date     Arrhythmia      Blood in semen      Complication of anesthesia [id number]    Patient said that jis pulse rate dropped during a surgical procedure     Decreased hearing     right ear.     Gout      Hypertension      Irregular heart beat      Renal disease     kidney stones       PAST SURGICAL HISTORY:  Past Surgical History:   Procedure Laterality Date     CHOLECYSTECTOMY       COLONOSCOPY       COLONOSCOPY N/A 5/22/2018    Procedure: COMBINED COLONOSCOPY, SINGLE OR MULTIPLE BIOPSY/POLYPECTOMY BY BIOPSY;  colonoscopy;  Surgeon: Hugh Pitt MD;  Location:  GI     EYE SURGERY      bilat catartact     LASER DIODE STAPEDECTOMY  4/11/2012    Procedure:LASER DIODE STAPEDECTOMY; Attempted Diode Laser Right Stapedectomy with laser standby **latex safe ; Surgeon:MECCA DAMON; Location: OR     LASER DIODE STAPEDECTOMY  5/23/2012    Procedure:LASER DIODE STAPEDECTOMY; Right Stapedectomy with Diode Laser  Latex Safe; Surgeon:MECCA DAMON; Location: OR       FAMILY HISTORY:  No family history on file.    SOCIAL HISTORY:  Social History     Socioeconomic History     Marital status:      Spouse name: None     Number of children: None     Years of education: None     Highest education level: None   Occupational History     None   Tobacco Use     Smoking status: Never Smoker     Smokeless tobacco: Never Used   Substance and Sexual Activity     Alcohol use: Yes     Comment: Rare     Drug use: No     Sexual activity: None   Other Topics Concern     Parent/sibling w/ CABG, MI or angioplasty before 65F 55M? Not Asked   Social History Narrative     None     Social Determinants of Health     Financial Resource Strain:      Difficulty of Paying Living Expenses:    Food Insecurity:      Worried About Running Out of " Food in the Last Year:      Ran Out of Food in the Last Year:    Transportation Needs:      Lack of Transportation (Medical):      Lack of Transportation (Non-Medical):    Physical Activity:      Days of Exercise per Week:      Minutes of Exercise per Session:    Stress:      Feeling of Stress :    Social Connections:      Frequency of Communication with Friends and Family:      Frequency of Social Gatherings with Friends and Family:      Attends [employer] Services:      Active Member of Clubs or Organizations:      Attends Club or Organization Meetings:      Marital Status:    Intimate Partner Violence:      Fear of Current or Ex-Partner:      Emotionally Abused:      Physically Abused:      Sexually Abused:        Review of Systems:  Skin:  Negative       Eyes:  Positive for glasses    ENT:  Negative      Respiratory:  Negative       Cardiovascular:  Negative;palpitations;chest pain;syncope or near-syncope;cyanosis;fatigue;lightheadedness;dizziness      Gastroenterology: Negative      Genitourinary:  not assessed      Musculoskeletal:  Negative      Neurologic:  Negative      Psychiatric:  Negative      Heme/Lymph/Imm:  Negative      Endocrine:  Negative

## 2021-08-27 NOTE — PROGRESS NOTES
"Service Date: 08/26/2021    HISTORY OF PRESENT ILLNESS:    I had the pleasure of seeing Mr. Brandan Evans, a pleasant 80-year-old gentleman who has been referred by the ER physicians at [facility] for discussion of management options for atrial fibrillation.    Mr. Evans has known paroxysmal atrial fibrillation dating back to at least 2012.  He was previously seen by my colleagues at [facility].    A 12-lead ECG from 2015 showed atrial fibrillation with controlled ventricular rate.  In 2021, at least 3 ECGs show atrial fibrillation with controlled ventricular rate.      The patient presented to the Emergency Room on 08/22/2021 primarily due to concern about high blood pressure.  I will note that the patient has rather poor recollection of details relating to his medical history, but thankfully his wife is a more accurate medical informant.  The two of them argued about details of his history on many occasions today.    In the Emergency Room, the patient's blood pressure was initially 158/92, but later was as high as 173/110.  He was in atrial fibrillation with controlled ventricular rates throughout his stay.  The ER physician felt the patient should see a cardiologist because of atrial fibrillation.  Mr. Evans was prescribed apixaban but did not start it because the pharmacy charged $312, which he cannot afford.      Going through his past medical history was challenging as the patient provided extremely tangential information.  He tells me he has been \"healthy\" except for gout attacks and edema involving his hands and legs.  He was recently started on furosemide 40 mg.  He takes low dose allopurinol 100 mg daily.    The patient is typically unaware of heart rate irregularity and does not have chest pain during modest exertion.  He does yoga as well as walk up to 1.5 miles on a daily basis.     He lives with his wife.  He is a nonsmoker and drinks minimal alcohol.      Family history is negative for premature heart " disease or atrial fibrillation.      PHYSICAL EXAMINATION:    VITAL SIGNS:  Blood pressure 144/87, heart rate 67 and irregular, weight 94.6 kg, height 180 cm.  GENERAL:  He is a very pleasant gentleman, but his history is all over the place.  HEAD:  Normocephalic, atraumatic.  NECK:  Supple, without carotid bruits.  LUNGS:  Clear with no crackles or wheezes.  CARDIOVASCULAR:  Normal LOCO, irregular rhythm without gallop, murmur or rub.  ABDOMEN:  Mildly obese, soft, nontender.  EXTREMITIES:  There is at least 1+ pitting edema below the knees.      DIAGNOSTIC STUDIES:    - Recent laboratory tests:  Sodium 138, potassium 3.8, creatinine 0.96, proBNP 1271, troponin less than 0.015, hematocrit 47%, platelets 235,000.  - His 12-lead ECG today showed atrial fibrillation with controlled ventricular rate, LAFB.  - Echocardiography on 08/25/2021 showed mildly decreased LVEF at 47% with global hypokinesis, normal RV, severe LA dilatation, 1+ MR, 1 to 2+ TR with normal IVC size.      IMPRESSION:     1.  Permanent atrial fibrillation.  It appears that the patient has been in atrial fibrillation for years.  He is mostly unaware of it and rate appears controlled.  His [id number] score is at least 3 (age, hypertension), thus I agree that he requires anticoagulation.  I explained why this is necessary.  He cannot afford Eliquis.  I have prescribed warfarin with target INR 2-3.   2.  Hypertension.  His BP has often been high lately.  I have prescribed losartan 25 mg daily.  3.  Lower extremity edema.  This has persisted despite furosemide 40 mg daily.  I recommended knee-high support stockings (apparently he has these at home);  apply them every morning and take them off at night.  4.  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  No angina.  Unclear etiology.  Will assess HR control with a Holter.       RECOMMENDATIONS:  A.  Start warfarin 5 mg daily with first INR 4 days later.  I specifically asked the patient to call Dr. Anderson's office today  and make an INR appointment for Monday, 2021.  B.  Add losartan 25 mg daily.  Monitor BP at home.  C.  BMP to be assessed 7-10 days after starting losartan.  D.  Knee-high support stockings.  E.  A 24-hour Holter monitor will be attached when he returns for his BMP.  F.  Followup appointment with any JASON in 1 month.  The patient does not require long-term follow up with EP.  If long-term cardiology care is needed, General Cardiology may be best for this patient.    I appreciate the opportunity to be part of his care.      Heena Melendez MD, FACC      cc:  Abad Anderson, [place] [address]      D: 2021   T: 2021   MT: rebekah    Name:     DEMETRIS KATIA PELAEZLuciana  MRN:      [id number]        Account:      [id number]   :      1940           Service Date: 2021       Document: [id number]

## 2021-08-30 ENCOUNTER — ANTICOAGULATION THERAPY VISIT (OUTPATIENT)
Dept: CARDIOLOGY | Facility: CLINIC | Age: 81
End: 2021-08-30

## 2021-08-30 NOTE — TELEPHONE ENCOUNTER
8/30/21 Call to [facility]. She stated INR order was recd. Explained that per Dr Melendez note from 8/26/21 PCP is to manage INRs   Start warfarin 5 mg daily with first INR 4 days later.  I specifically asked the patient to call Dr. Anderson's office today and make an INR appointment for Monday, 08/30/2021.  [facility] stated she will alert nsg term to obtain orders from [facility] PCP Dr Anderson.  Kalina 252 pm

## 2021-09-01 NOTE — TELEPHONE ENCOUNTER
9/1/21 LM on pts VM checking to see if INR was done at PCP as instructed. LM and requested callback  KHerroRN 1115 am

## 2021-09-01 NOTE — TELEPHONE ENCOUNTER
9/1/21 Erick wife of pt called back to report pt had INR drawn yesterday . His next scheduled appt is Friday. CareEverywhere now updated so I can see encounter now.  This encounter closed.  Kalina 1154 am

## 2021-09-02 ENCOUNTER — HOSPITAL ENCOUNTER (OUTPATIENT)
Dept: CARDIOLOGY | Facility: CLINIC | Age: 81
Discharge: HOME | End: 2021-09-02
Attending: INTERNAL MEDICINE | Admitting: INTERNAL MEDICINE
Payer: COMMERCIAL

## 2021-09-02 ENCOUNTER — LAB (OUTPATIENT)
Dept: LAB | Facility: CLINIC | Age: 81
End: 2021-09-02
Payer: COMMERCIAL

## 2021-09-02 DIAGNOSIS — I48.21 PERMANENT ATRIAL FIBRILLATION (H): ICD-10-CM

## 2021-09-02 LAB
ANION GAP SERPL CALCULATED.3IONS-SCNC: 1 MMOL/L (ref 3–14)
BUN SERPL-MCNC: 14 MG/DL (ref 7–30)
CALCIUM SERPL-MCNC: 8.8 MG/DL (ref 8.5–10.1)
CHLORIDE BLD-SCNC: 111 MMOL/L (ref 94–109)
CO2 SERPL-SCNC: 29 MMOL/L (ref 20–32)
CREAT SERPL-MCNC: 1.05 MG/DL (ref 0.66–1.25)
GFR SERPL CREATININE-BSD FRML MDRD: 66 ML/MIN/1.73M2
GLUCOSE BLD-MCNC: 93 MG/DL (ref 70–99)
POTASSIUM BLD-SCNC: 4.5 MMOL/L (ref 3.4–5.3)
SODIUM SERPL-SCNC: 141 MMOL/L (ref 133–144)

## 2021-09-02 PROCEDURE — 36415 COLL VENOUS BLD VENIPUNCTURE: CPT

## 2021-09-02 PROCEDURE — 80048 BASIC METABOLIC PNL TOTAL CA: CPT

## 2021-09-02 PROCEDURE — 93225 XTRNL ECG REC<48 HRS REC: CPT

## 2021-09-02 PROCEDURE — 93227 XTRNL ECG REC<48 HR R&I: CPT | Performed by: INTERNAL MEDICINE

## 2021-09-04 ENCOUNTER — HEALTH MAINTENANCE LETTER (OUTPATIENT)
Age: 81
End: 2021-09-04

## 2021-09-20 NOTE — PROGRESS NOTES
"[facility]    I had the pleasure of seeing Clayton when he came for follow up of AFib.  This 80 year old sees Dr. Melendez for his history of:    1. Permanent AFib; EF 47% - dating back to at least . ER 2021 for high BP and noted to be in AFib with CVR.   2. HTN  3. Mild CM; 47% - noted on echo 2021. Of note, EF was normal 55-60% 2018 (ANW echo)  4. NSVT - noted on Holter 2021. Asx'c. Noted on Holter 2021      Dr. Melendez met Clayton and his wife Erick in consultation 2021. He'd been in AFib for years but had been followed at [facility] and when he was in the ER for high BP, it was felt he should be evaluated for this.  Dr. Melendez noted permanent AFib and rec'd losartan for SBP and warfarin (as Eliquis was too expensive) for CHADSVASc 3 (HTN, age). He was instructed to wear stockings for his LE edema. 24 hour Holter rec'd to assess HR control.     Holter showed good HR control 63 bpm with episodes of NSVT x 6.     Interval History:  Clayton notes some SOB with climbing stairs, which seems new. No CP associated.     He checks weights daily. He ended up holding the Lasix x 3 days (he says, though Erick says it was over 7 days) and noted weight goes up when he stops it. Still notes L>R LE edema.    Hasn't checked BP at home at all. Tolerating the losartan 25 mg daily    VITALS:  Vitals: BP (!) 148/86   Ht 1.816 m (5' 11.5\")   Wt 92.5 kg (204 lb)   SpO2 98%   BMI 28.06 kg/m      Diagnostic Testin hour Holter 2021 on metoprolol tartrate 25 mg BID showed AFib with avg HR 63 bpm (range 34--123 bpm). 4% PVCs. 5 beat run of NSVT @ 165 bpm  EKG 2021 on metoprolol tartrate 25 mg BID showed AFib with CVR. LAFB  Echocardiogram 2021 - LVEF 47%. Nl RV. sev LA dilation, 1+MR, 1-2+TR with nl IVC size   2021 9/10/2021    2021    9/3/2021     2021   INR 1.7 2.3                   1.9             1.8              1.2              Plan:  Nuclear Stress Test  Follow-up with me in " 1 month  Increase Losartan 50 mg daily    Assessment/Plan:    1. Permanent AFib; EF 47%    On metoprolol tartrate 25 mg BID and Holter shows good HR control    Started on warfarin at last visit for CHADSVASc at least 3 (HTN, age).     PLAN:    Continue current medications    Continue warfarin with INR goal 2-3    2. NSVT    Noted on Holter monitor 9/2021    Asx'c    PLAN:    Given low EF (47%) and NSVT, will plan nuc stress test     See me back to review    Continue BB threapy    3. LE edema    Remains on Lasix 40 mg daily    Wearing compression stockings but they're not tight/actually compression    No overt HF on exam    PLAN:    Continue to limit sodium intake    Increase losartan    4. HTN    Doesn't check at home but here, was high even when I rechecked    Remains on Lasix 40 and lopressor 25 mg BID and losartan 25     PLAN:    Increase losartan to 50 mg daily. New Rx filled so he can use after he uses the 25 mg tabs up (2 at once)    Keep track of BPs daily and bring in    Teresita Yadav PA-C, MSPAS      Orders Placed This Encounter   Procedures     Follow-Up with Cardiac Advanced Practice Provider     Orders Placed This Encounter   Medications     losartan (COZAAR) 50 MG tablet     Sig: Take 1 tablet (50 mg) by mouth daily     Dispense:  30 tablet     Refill:  5     Replaces 25 mg tabs     Medications Discontinued During This Encounter   Medication Reason     losartan (COZAAR) 25 MG tablet          Encounter Diagnoses   Name Primary?     Permanent atrial fibrillation (H)      Paroxysmal ventricular tachycardia (H) Yes       CURRENT MEDICATIONS:  Current Outpatient Medications   Medication Sig Dispense Refill     allopurinol (ZYLOPRIM) 100 MG tablet Take 100 mg by mouth daily       furosemide (LASIX) 40 MG tablet Take 40 mg by mouth daily       losartan (COZAAR) 50 MG tablet Take 1 tablet (50 mg) by mouth daily 30 tablet 5     metoprolol tartrate (LOPRESSOR) 25 MG tablet Take 1 tablet (25 mg) by mouth 2 times  "daily 60 tablet 0     warfarin ANTICOAGULANT (COUMADIN) 5 MG tablet Start with 1 tab daily, thereafter use as directed by INR Clinic 45 tablet 3       ALLERGIES     Allergies   Allergen Reactions     Bacitracin      Other reaction(s): Contact Dermatitis     Clindamycin      Other reaction(s): GI Upset     Soap Itching     Other reaction(s): Contact Dermatitis  blisters and itching from \"[employer] soap\" used for cleansing LEs 10/2006.     Triamcinolone      Can not use cream  Caused reddening         Review of Systems:  Skin:  Negative     Eyes:  Positive for glasses  ENT:  Negative    Respiratory:  Positive for dyspnea on exertion  Cardiovascular:  palpitations;chest pain;syncope or near-syncope;cyanosis;fatigue;lightheadedness;dizziness;Negative for Positive for;edema  Gastroenterology: Negative    Genitourinary:  not assessed    Musculoskeletal:  Negative    Neurologic:  Negative    Psychiatric:  Negative    Heme/Lymph/Imm:  Negative    Endocrine:  Negative      Physical Exam:  Vitals: BP (!) 148/86   Ht 1.816 m (5' 11.5\")   Wt 92.5 kg (204 lb)   SpO2 98%   BMI 28.06 kg/m      Constitutional:  cooperative, alert and oriented, well developed, well nourished, in no acute distress        Skin:  warm and dry to the touch, no apparent skin lesions or masses noted        Head:  normocephalic, no masses or lesions        Eyes:  pupils equal and round;conjunctivae and lids unremarkable;sclera white        ENT:  not assessed this visit        Neck:  JVP normal;no carotid bruit        Chest:  normal breath sounds, clear to auscultation, normal A-P diameter, normal symmetry, normal respiratory excursion, no use of accessory muscles        Cardiac:   irregular rhythm                Abdomen:  abdomen soft        Vascular: pulses full and equal                                      Extremities and Back:  no deformities, clubbing, cyanosis, erythema observed        Neurological:  no gross motor deficits            PAST " MEDICAL HISTORY:  Past Medical History:   Diagnosis Date     Arrhythmia      Blood in semen      Complication of anesthesia 9970-7682    Patient said that jis pulse rate dropped during a surgical procedure     Decreased hearing     right ear.     Gout      Hypertension      Irregular heart beat      Renal disease     kidney stones       PAST SURGICAL HISTORY:  Past Surgical History:   Procedure Laterality Date     CHOLECYSTECTOMY       COLONOSCOPY       COLONOSCOPY N/A 5/22/2018    Procedure: COMBINED COLONOSCOPY, SINGLE OR MULTIPLE BIOPSY/POLYPECTOMY BY BIOPSY;  colonoscopy;  Surgeon: Hugh Pitt MD;  Location:  GI     EYE SURGERY      bilat catartact     LASER DIODE STAPEDECTOMY  4/11/2012    Procedure:LASER DIODE STAPEDECTOMY; Attempted Diode Laser Right Stapedectomy with laser standby **latex safe ; Surgeon:MECCA DAMON; Location: OR     LASER DIODE STAPEDECTOMY  5/23/2012    Procedure:LASER DIODE STAPEDECTOMY; Right Stapedectomy with Diode Laser  Latex Safe; Surgeon:MECCA DAMON; Location: OR       FAMILY HISTORY:  History reviewed. No pertinent family history.    SOCIAL HISTORY:  Social History     Socioeconomic History     Marital status:      Spouse name: None     Number of children: None     Years of education: None     Highest education level: None   Occupational History     None   Tobacco Use     Smoking status: Never Smoker     Smokeless tobacco: Never Used   Substance and Sexual Activity     Alcohol use: Not Currently     Drug use: No     Sexual activity: None   Other Topics Concern     Parent/sibling w/ CABG, MI or angioplasty before 65F 55M? Not Asked   Social History Narrative     None     Social Determinants of Health     Financial Resource Strain:      Difficulty of Paying Living Expenses:    Food Insecurity:      Worried About Running Out of Food in the Last Year:      Ran Out of Food in the Last Year:    Transportation Needs:      Lack of  Transportation (Medical):      Lack of Transportation (Non-Medical):    Physical Activity:      Days of Exercise per Week:      Minutes of Exercise per Session:    Stress:      Feeling of Stress :    Social Connections:      Frequency of Communication with Friends and Family:      Frequency of Social Gatherings with Friends and Family:      Attends Taoism Services:      Active Member of Clubs or Organizations:      Attends Club or Organization Meetings:      Marital Status:    Intimate Partner Violence:      Fear of Current or Ex-Partner:      Emotionally Abused:      Physically Abused:      Sexually Abused:

## 2021-09-24 ENCOUNTER — OFFICE VISIT (OUTPATIENT)
Dept: CARDIOLOGY | Facility: CLINIC | Age: 81
End: 2021-09-24
Payer: COMMERCIAL

## 2021-09-24 VITALS
BODY MASS INDEX: 27.63 KG/M2 | SYSTOLIC BLOOD PRESSURE: 148 MMHG | OXYGEN SATURATION: 98 % | DIASTOLIC BLOOD PRESSURE: 86 MMHG | WEIGHT: 204 LBS | HEIGHT: 72 IN

## 2021-09-24 DIAGNOSIS — I47.29 PAROXYSMAL VENTRICULAR TACHYCARDIA (H): Primary | ICD-10-CM

## 2021-09-24 DIAGNOSIS — I48.21 PERMANENT ATRIAL FIBRILLATION (H): ICD-10-CM

## 2021-09-24 PROCEDURE — 99204 OFFICE O/P NEW MOD 45 MIN: CPT | Performed by: PHYSICIAN ASSISTANT

## 2021-09-24 RX ORDER — LOSARTAN POTASSIUM 50 MG/1
50 TABLET ORAL DAILY
Qty: 30 TABLET | Refills: 5 | Status: SHIPPED | OUTPATIENT
Start: 2021-09-24

## 2021-09-24 ASSESSMENT — MIFFLIN-ST. JEOR: SCORE: 1660.4

## 2021-09-24 NOTE — LETTER
"2021    Abad Camposdorethaumu  [address]  [facility] [address]    RE: Brandan Evans       Dear Colleague,    I had the pleasure of seeing Brandan LATANYA Evans in the [facility].    [facility]    I had the pleasure of seeing Clayton when he came for follow up of AFib.  This 80 year old sees Dr. Melendez for his history of:    1. Permanent AFib; EF 47% - dating back to at least . ER 2021 for high BP and noted to be in AFib with CVR.   2. HTN  3. Mild CM; 47% - noted on echo 2021. Of note, EF was normal 55-60% 2018 (ANW echo)  4. NSVT - noted on Holter 2021. Asx'c. Noted on Holter 2021      Dr. Melendez met Clayton and his wife Erick in consultation 2021. He'd been in AFib for years but had been followed at [facility] and when he was in the ER for high BP, it was felt he should be evaluated for this.  Dr. Melendez noted permanent AFib and rec'd losartan for SBP and warfarin (as Eliquis was too expensive) for CHADSVASc 3 (HTN, age). He was instructed to wear stockings for his LE edema. 24 hour Holter rec'd to assess HR control.     Holter showed good HR control 63 bpm with episodes of NSVT x 6.     Interval History:  Clayton notes some SOB with climbing stairs, which seems new. No CP associated.     He checks weights daily. He ended up holding the Lasix x 3 days (he says, though Erick says it was over 7 days) and noted weight goes up when he stops it. Still notes L>R LE edema.    Hasn't checked BP at home at all. Tolerating the losartan 25 mg daily    VITALS:  Vitals: BP (!) 148/86   Ht 1.816 m (5' 11.5\")   Wt 92.5 kg (204 lb)   SpO2 98%   BMI 28.06 kg/m      Diagnostic Testin hour Holter 2021 on metoprolol tartrate 25 mg BID showed AFib with avg HR 63 bpm (range 34--123 bpm). 4% PVCs. 5 beat run of NSVT @ 165 bpm  EKG 2021 on metoprolol tartrate 25 mg BID showed AFib with CVR. LAFB  Echocardiogram 2021 - " LVEF 47%. Nl RV. sev LA dilation, 1+MR, 1-2+TR with nl IVC size   9/23/2021 9/10/2021    9/7/2021    9/3/2021     8/30/2021   INR 1.7 2.3                   1.9             1.8              1.2              Plan:  Nuclear Stress Test  Follow-up with me in 1 month  Increase Losartan 50 mg daily    Assessment/Plan:    1. Permanent AFib; EF 47%    On metoprolol tartrate 25 mg BID and Holter shows good HR control    Started on warfarin at last visit for CHADSVASc at least 3 (HTN, age).     PLAN:    Continue current medications    Continue warfarin with INR goal 2-3    2. NSVT    Noted on Holter monitor 9/2021    Asx'c    PLAN:    Given low EF (47%) and NSVT, will plan nuc stress test     See me back to review    Continue BB threapy    3. LE edema    Remains on Lasix 40 mg daily    Wearing compression stockings but they're not tight/actually compression    No overt HF on exam    PLAN:    Continue to limit sodium intake    Increase losartan    4. HTN    Doesn't check at home but here, was high even when I rechecked    Remains on Lasix 40 and lopressor 25 mg BID and losartan 25     PLAN:    Increase losartan to 50 mg daily. New Rx filled so he can use after he uses the 25 mg tabs up (2 at once)    Keep track of BPs daily and bring in    Teresita Yadav PA-C, MSPAS      Orders Placed This Encounter   Procedures     Follow-Up with Cardiac Advanced Practice Provider     Orders Placed This Encounter   Medications     losartan (COZAAR) 50 MG tablet     Sig: Take 1 tablet (50 mg) by mouth daily     Dispense:  30 tablet     Refill:  5     Replaces 25 mg tabs     Medications Discontinued During This Encounter   Medication Reason     losartan (COZAAR) 25 MG tablet          Encounter Diagnoses   Name Primary?     Permanent atrial fibrillation (H)      Paroxysmal ventricular tachycardia (H) Yes       CURRENT MEDICATIONS:  Current Outpatient Medications   Medication Sig Dispense Refill     allopurinol (ZYLOPRIM) 100 MG tablet Take 100 mg  "by mouth daily       furosemide (LASIX) 40 MG tablet Take 40 mg by mouth daily       losartan (COZAAR) 50 MG tablet Take 1 tablet (50 mg) by mouth daily 30 tablet 5     metoprolol tartrate (LOPRESSOR) 25 MG tablet Take 1 tablet (25 mg) by mouth 2 times daily 60 tablet 0     warfarin ANTICOAGULANT (COUMADIN) 5 MG tablet Start with 1 tab daily, thereafter use as directed by INR Clinic 45 tablet 3       ALLERGIES     Allergies   Allergen Reactions     Bacitracin      Other reaction(s): Contact Dermatitis     Clindamycin      Other reaction(s): GI Upset     Soap Itching     Other reaction(s): Contact Dermatitis  blisters and itching from \"[employer] soap\" used for cleansing LEs 10/2006.     Triamcinolone      Can not use cream  Caused reddening         Review of Systems:  Skin:  Negative     Eyes:  Positive for glasses  ENT:  Negative    Respiratory:  Positive for dyspnea on exertion  Cardiovascular:  palpitations;chest pain;syncope or near-syncope;cyanosis;fatigue;lightheadedness;dizziness;Negative for Positive for;edema  Gastroenterology: Negative    Genitourinary:  not assessed    Musculoskeletal:  Negative    Neurologic:  Negative    Psychiatric:  Negative    Heme/Lymph/Imm:  Negative    Endocrine:  Negative      Physical Exam:  Vitals: BP (!) 148/86   Ht 1.816 m (5' 11.5\")   Wt 92.5 kg (204 lb)   SpO2 98%   BMI 28.06 kg/m      Constitutional:  cooperative, alert and oriented, well developed, well nourished, in no acute distress        Skin:  warm and dry to the touch, no apparent skin lesions or masses noted        Head:  normocephalic, no masses or lesions        Eyes:  pupils equal and round;conjunctivae and lids unremarkable;sclera white        ENT:  not assessed this visit        Neck:  JVP normal;no carotid bruit        Chest:  normal breath sounds, clear to auscultation, normal A-P diameter, normal symmetry, normal respiratory excursion, no use of accessory muscles        Cardiac:   irregular rhythm       "          Abdomen:  abdomen soft        Vascular: pulses full and equal                                      Extremities and Back:  no deformities, clubbing, cyanosis, erythema observed        Neurological:  no gross motor deficits            PAST MEDICAL HISTORY:  Past Medical History:   Diagnosis Date     Arrhythmia      Blood in semen      Complication of anesthesia [id number]    Patient said that jis pulse rate dropped during a surgical procedure     Decreased hearing     right ear.     Gout      Hypertension      Irregular heart beat      Renal disease     kidney stones       PAST SURGICAL HISTORY:  Past Surgical History:   Procedure Laterality Date     CHOLECYSTECTOMY       COLONOSCOPY       COLONOSCOPY N/A 5/22/2018    Procedure: COMBINED COLONOSCOPY, SINGLE OR MULTIPLE BIOPSY/POLYPECTOMY BY BIOPSY;  colonoscopy;  Surgeon: Hugh Pitt MD;  Location:  GI     EYE SURGERY      bilat catartact     LASER DIODE STAPEDECTOMY  4/11/2012    Procedure:LASER DIODE STAPEDECTOMY; Attempted Diode Laser Right Stapedectomy with laser standby **latex safe ; Surgeon:MECCA DAMON; Location: OR     LASER DIODE STAPEDECTOMY  5/23/2012    Procedure:LASER DIODE STAPEDECTOMY; Right Stapedectomy with Diode Laser  Latex Safe; Surgeon:MECCA DAMON; Location: OR       FAMILY HISTORY:  History reviewed. No pertinent family history.    SOCIAL HISTORY:  Social History     Socioeconomic History     Marital status:      Spouse name: None     Number of children: None     Years of education: None     Highest education level: None   Occupational History     None   Tobacco Use     Smoking status: Never Smoker     Smokeless tobacco: Never Used   Substance and Sexual Activity     Alcohol use: Not Currently     Drug use: No     Sexual activity: None   Other Topics Concern     Parent/sibling w/ CABG, MI or angioplasty before 65F 55M? Not Asked   Social History Narrative     None     Social Determinants of  Health     Financial Resource Strain:      Difficulty of Paying Living Expenses:    Food Insecurity:      Worried About Running Out of Food in the Last Year:      Ran Out of Food in the Last Year:    Transportation Needs:      Lack of Transportation (Medical):      Lack of Transportation (Non-Medical):    Physical Activity:      Days of Exercise per Week:      Minutes of Exercise per Session:    Stress:      Feeling of Stress :    Social Connections:      Frequency of Communication with Friends and Family:      Frequency of Social Gatherings with Friends and Family:      Attends Congregation Services:      Active Member of Clubs or Organizations:      Attends Club or Organization Meetings:      Marital Status:    Intimate Partner Violence:      Fear of Current or Ex-Partner:      Emotionally Abused:      Physically Abused:      Sexually Abused:                    Thank you for allowing me to participate in the care of your patient.      Sincerely,     Sherita Yadav PA-C     [facility]  cc:   Heena Melendez MD  [address] [facility] [address]  [place] [address]

## 2021-09-24 NOTE — PATIENT INSTRUCTIONS
Clayton - it was good to meet you and  Erick today!    1. Reviewed INR - goal is 2-3 to help prevent stroke but not increase risk of bleeding too much.   2. Reviewed that BP still too high despite starting losartan 25 mg daily (this was a low dose).  3. Monitor showed good heart rate control on the metoprolol, avg HR 63 bpm. Your heart rate got slow (30s) during napping and then up to 120s with exertion. This is good!    PLAN:  1. Continue metoprolol tartrate 25 mg twice a day for good heart rate control.  2. Get stress test to assess for blockages causing funny heart rhythms.   3. INCREASE losartan to 50 mg daily - OK to take 2 of the 25 mg tabs to use up, then get the 50 mg tab from pharmacy  4. Take BP daily ~10-12 AM and write down    5. See me back to review stress test and bring list of BPs with!     [phone]

## 2021-09-27 ENCOUNTER — TELEPHONE (OUTPATIENT)
Dept: CARDIOLOGY | Facility: CLINIC | Age: 81
End: 2021-09-27

## 2021-09-27 NOTE — TELEPHONE ENCOUNTER
Pt had a HR reading of 47 at 1130, BP was 135/85 and pt was worried that was to to low. Pt did not feel lightheaded and when rechecked at 1606 pt HR was 60 bpm and BP was 148/95.  Reminded both pt and wife to keep monitoring the BP and HR and if another low HR comes up wait a little time and check again. Pt and wife will call if HR stays low.  Pt asked about his potassium and if he should still be taking.  Looking at med lists in idio and care everywhere and Potassium is not on the list. Pt will check with his PMD.  Gutierrez

## 2021-10-06 ENCOUNTER — HOSPITAL ENCOUNTER (OUTPATIENT)
Dept: CARDIOLOGY | Facility: CLINIC | Age: 81
End: 2021-10-06
Attending: PHYSICIAN ASSISTANT
Payer: COMMERCIAL

## 2021-10-06 VITALS
SYSTOLIC BLOOD PRESSURE: 132 MMHG | DIASTOLIC BLOOD PRESSURE: 80 MMHG | HEIGHT: 72 IN | BODY MASS INDEX: 27.25 KG/M2 | OXYGEN SATURATION: 98 % | HEART RATE: 57 BPM | WEIGHT: 201.2 LBS

## 2021-10-06 DIAGNOSIS — I48.0 PAROXYSMAL ATRIAL FIBRILLATION (H): Primary | ICD-10-CM

## 2021-10-06 DIAGNOSIS — I47.29 PAROXYSMAL VENTRICULAR TACHYCARDIA (H): ICD-10-CM

## 2021-10-06 LAB
CV BLOOD PRESSURE: 54 MMHG
CV STRESS MAX HR HE: 94 BPM
NUC STRESS EJECTION FRACTION: 40 %
RATE PRESSURE PRODUCT: NORMAL
STRESS ECHO BASELINE DIASTOLIC HE: 80
STRESS ECHO BASELINE HR: 63 BPM
STRESS ECHO BASELINE SYSTOLIC BP: 132
STRESS ECHO CALCULATED PERCENT HR: 68 %
STRESS ECHO LAST STRESS DIASTOLIC BP: 76
STRESS ECHO LAST STRESS SYSTOLIC BP: 126
STRESS ECHO TARGET HR: 139
STRESS/REST PERFUSION RATIO: 1.07

## 2021-10-06 PROCEDURE — A9502 TC99M TETROFOSMIN: HCPCS | Performed by: PHYSICIAN ASSISTANT

## 2021-10-06 PROCEDURE — 250N000011 HC RX IP 250 OP 636: Performed by: INTERNAL MEDICINE

## 2021-10-06 PROCEDURE — 78452 HT MUSCLE IMAGE SPECT MULT: CPT

## 2021-10-06 PROCEDURE — 93018 CV STRESS TEST I&R ONLY: CPT | Performed by: INTERNAL MEDICINE

## 2021-10-06 PROCEDURE — 78452 HT MUSCLE IMAGE SPECT MULT: CPT | Mod: 26 | Performed by: INTERNAL MEDICINE

## 2021-10-06 PROCEDURE — 343N000001 HC RX 343: Performed by: PHYSICIAN ASSISTANT

## 2021-10-06 PROCEDURE — 93016 CV STRESS TEST SUPVJ ONLY: CPT | Performed by: INTERNAL MEDICINE

## 2021-10-06 RX ORDER — AMINOPHYLLINE 25 MG/ML
50-100 INJECTION, SOLUTION INTRAVENOUS
Status: DISCONTINUED | OUTPATIENT
Start: 2021-10-06 | End: 2021-10-07 | Stop reason: HOSPADM

## 2021-10-06 RX ORDER — ALBUTEROL SULFATE 90 UG/1
2 AEROSOL, METERED RESPIRATORY (INHALATION) EVERY 5 MIN PRN
Status: DISCONTINUED | OUTPATIENT
Start: 2021-10-06 | End: 2021-10-07 | Stop reason: HOSPADM

## 2021-10-06 RX ORDER — REGADENOSON 0.08 MG/ML
0.4 INJECTION, SOLUTION INTRAVENOUS ONCE
Status: COMPLETED | OUTPATIENT
Start: 2021-10-06 | End: 2021-10-06

## 2021-10-06 RX ORDER — CAFFEINE CITRATE 20 MG/ML
60 SOLUTION INTRAVENOUS
Status: DISCONTINUED | OUTPATIENT
Start: 2021-10-06 | End: 2021-10-07 | Stop reason: HOSPADM

## 2021-10-06 RX ORDER — ACYCLOVIR 200 MG/1
0-1 CAPSULE ORAL
Status: DISCONTINUED | OUTPATIENT
Start: 2021-10-06 | End: 2021-10-07 | Stop reason: HOSPADM

## 2021-10-06 RX ADMIN — TETROFOSMIN 3.93 MCI.: 1.38 INJECTION, POWDER, LYOPHILIZED, FOR SOLUTION INTRAVENOUS at 13:27

## 2021-10-06 RX ADMIN — REGADENOSON 0.4 MG: 0.08 INJECTION, SOLUTION INTRAVENOUS at 14:52

## 2021-10-06 RX ADMIN — TETROFOSMIN 13.01 MCI.: 1.38 INJECTION, POWDER, LYOPHILIZED, FOR SOLUTION INTRAVENOUS at 14:56

## 2021-10-06 ASSESSMENT — MIFFLIN-ST. JEOR: SCORE: 1655.64

## 2021-10-20 NOTE — PROGRESS NOTES
"[facility]    I had the pleasure of seeing Clayton when he and his wife Erick came for follow up of AFib and test results.  This 80 year old sees Dr. Melendez for his history of:    1. Permanent AFib; EF 47% - dating back to at least 2012. ER 8/2021 for high BP and noted to be in AFib with CVR.  CHADSVASc 3 (HTN, age)  2. HTN  3. Mild CM; 47% - noted on echo 8/2021. Of note, EF was normal 55-60% 11/2018 (ANW echo)  4. NSVT - noted on Holter 9/2021. Asx'c. Noted on Holter 9/2021      I met Clayton 9/2021 at which time we reviewed his Holter showing AFib with good HR control @ 63 bpm. He had some episodes of NSVT and noted some increasing SOB climbing stairs.  Due to VT and TORRES, I rec'd nuc stress test as well as increased losartan dosing d/t HTN.     Stress test 10/2021 was negative for ischemia.    Interval History:  Overall he's done well since I last saw him, with improved breathing. Erick still points out his LE edema, but they both agree it's some better. No c/o orthopnea, PND.    No c/o CP. No trouble with palpitations, dizziness or lightheadedness.     BPs at home have really been good since we increased the losartan to 50 mg daily, now running 110-140s, typically 120s. Earlier today was 112/88.  He's really pleased!     VITALS:  Vitals: BP (!) 156/100   Pulse 50   Ht 1.816 m (5' 11.5\")   Wt 93 kg (205 lb)   SpO2 100%   BMI 28.19 kg/m      Diagnostic Testing:  Nuc Stress Test 10/2021 no ischemia/infarction. EF 54%, mildly reduced. TID absent. LV cavity enlarged  24 hour Holter 9/2/2021 on metoprolol tartrate 25 mg BID showed AFib with avg HR 63 bpm (range 34--123 bpm). 4% PVCs. 5 beat run of NSVT @ 165 bpm  EKG 8/26/2021 on metoprolol tartrate 25 mg BID showed AFib with CVR. LAFB  Echocardiogram 8/25/2021 - LVEF 47%. Nl RV. sev LA dilation, 1+MR, 1-2+TR with nl IVC       Plan:  6 m follow-up     Assessment/Plan:    1. HTN    Noted that ~1 week after increasing losartan to 50 mg daily, BPs " "started coming down and are now excellent (initially high here)    PLAN:    Continue current meds    2. Permanent AFib    Remains under good HR control with low dose metoprolol tartrate    Remains on warfarin     PLAN:    Continue current medication    Reviewed that we'd continue warfarin despite his \"stable AFib\" d/t CHADSVASc 3 (HTN, age)    See me or Dr. Melendez 6 months    3. CM, EF 47%    Remains on Lasix 40 mg daily with relatively good results    Remains on BB and ARB therapy    Stress test with dilated LV; on ARB    On exam, no crackles; has only 1+ swelling     PLAN:    Compression stockings     Continue to limit sodium in diet    4. TORRES    Improved on the Lasix 40 mg daily and increased losartan 50 mg daily for BP control    Stress test negative for ischemia and confirmed    On exam, no crackles; has only 1+ swelling     PLAN:    As improved, will make no changes      Teresita Yadav PA-C, MSPAS      Orders Placed This Encounter   Procedures     Basic metabolic panel     Follow-Up with Cardiac Advanced Practice Provider     Orders Placed This Encounter   Medications     metoprolol tartrate (LOPRESSOR) 25 MG tablet     Sig: Take 1 tablet (25 mg) by mouth 2 times daily     Dispense:  180 tablet     Refill:  3     Keep on file until pt is due     Medications Discontinued During This Encounter   Medication Reason     metoprolol tartrate (LOPRESSOR) 25 MG tablet Reorder         Encounter Diagnoses   Name Primary?     Permanent atrial fibrillation (H)      Paroxysmal ventricular tachycardia (H)        CURRENT MEDICATIONS:  Current Outpatient Medications   Medication Sig Dispense Refill     metoprolol tartrate (LOPRESSOR) 25 MG tablet Take 1 tablet (25 mg) by mouth 2 times daily 180 tablet 3     allopurinol (ZYLOPRIM) 100 MG tablet Take 100 mg by mouth daily       furosemide (LASIX) 40 MG tablet Take 40 mg by mouth daily       losartan (COZAAR) 50 MG tablet Take 1 tablet (50 mg) by mouth daily 30 tablet 5     warfarin " "ANTICOAGULANT (COUMADIN) 5 MG tablet Start with 1 tab daily, thereafter use as directed by INR Clinic 45 tablet 3       ALLERGIES     Allergies   Allergen Reactions     Bacitracin      Other reaction(s): Contact Dermatitis     Clindamycin      Other reaction(s): GI Upset     Soap Itching     Other reaction(s): Contact Dermatitis  blisters and itching from \"[employer] soap\" used for cleansing LEs 10/2006.     Triamcinolone      Can not use cream  Caused reddening         Review of Systems:  Skin:  Negative     Eyes:  Positive for glasses  ENT:  Negative    Respiratory:  Positive for dyspnea on exertion  Cardiovascular:  palpitations;chest pain;syncope or near-syncope;cyanosis;fatigue;lightheadedness;dizziness;Negative for Positive for;edema  Gastroenterology: Positive for hematochezia;melena  Genitourinary:  not assessed    Musculoskeletal:  Negative    Neurologic:  Negative    Psychiatric:  Negative    Heme/Lymph/Imm:  Negative    Endocrine:  Negative      Physical Exam:  Vitals: BP (!) 156/100   Pulse 50   Ht 1.816 m (5' 11.5\")   Wt 93 kg (205 lb)   SpO2 100%   BMI 28.19 kg/m      Constitutional:  cooperative, alert and oriented, well developed, well nourished, in no acute distress        Skin:  warm and dry to the touch, no apparent skin lesions or masses noted        Head:  normocephalic, no masses or lesions        Eyes:  pupils equal and round;conjunctivae and lids unremarkable;sclera white        ENT:  not assessed this visit        Neck:  JVP normal;no carotid bruit        Chest:  normal breath sounds, clear to auscultation, normal A-P diameter, normal symmetry, normal respiratory excursion, no use of accessory muscles        Cardiac:   irregular rhythm                Abdomen:  abdomen soft        Vascular: pulses full and equal                                      Extremities and Back:  no deformities, clubbing, cyanosis, erythema observed        Neurological:  no gross motor deficits            PAST " MEDICAL HISTORY:  Past Medical History:   Diagnosis Date     Arrhythmia      Blood in semen      Complication of anesthesia [id number]    Patient said that jis pulse rate dropped during a surgical procedure     Decreased hearing     right ear.     Gout      Hypertension      Irregular heart beat      Renal disease     kidney stones       PAST SURGICAL HISTORY:  Past Surgical History:   Procedure Laterality Date     CHOLECYSTECTOMY       COLONOSCOPY       COLONOSCOPY N/A 5/22/2018    Procedure: COMBINED COLONOSCOPY, SINGLE OR MULTIPLE BIOPSY/POLYPECTOMY BY BIOPSY;  colonoscopy;  Surgeon: Hugh Pitt MD;  Location:  GI     EYE SURGERY      bilat catartact     LASER DIODE STAPEDECTOMY  4/11/2012    Procedure:LASER DIODE STAPEDECTOMY; Attempted Diode Laser Right Stapedectomy with laser standby **latex safe ; Surgeon:MECCA DAMON; Location: OR     LASER DIODE STAPEDECTOMY  5/23/2012    Procedure:LASER DIODE STAPEDECTOMY; Right Stapedectomy with Diode Laser  Latex Safe; Surgeon:MECCA DAMON; Location: OR       FAMILY HISTORY:  History reviewed. No pertinent family history.    SOCIAL HISTORY:  Social History     Socioeconomic History     Marital status:      Spouse name: None     Number of children: None     Years of education: None     Highest education level: None   Occupational History     None   Tobacco Use     Smoking status: Never Smoker     Smokeless tobacco: Never Used   Substance and Sexual Activity     Alcohol use: Not Currently     Drug use: No     Sexual activity: None   Other Topics Concern     Parent/sibling w/ CABG, MI or angioplasty before 65F 55M? Not Asked   Social History Narrative     None     Social Determinants of Health     Financial Resource Strain:      Difficulty of Paying Living Expenses:    Food Insecurity:      Worried About Running Out of Food in the Last Year:      Ran Out of Food in the Last Year:    Transportation Needs:      Lack of  Transportation (Medical):      Lack of Transportation (Non-Medical):    Physical Activity:      Days of Exercise per Week:      Minutes of Exercise per Session:    Stress:      Feeling of Stress :    Social Connections:      Frequency of Communication with Friends and Family:      Frequency of Social Gatherings with Friends and Family:      Attends Confucianism Services:      Active Member of Clubs or Organizations:      Attends Club or Organization Meetings:      Marital Status:    Intimate Partner Violence:      Fear of Current or Ex-Partner:      Emotionally Abused:      Physically Abused:      Sexually Abused:

## 2021-10-21 ENCOUNTER — TELEPHONE (OUTPATIENT)
Dept: CARDIOLOGY | Facility: CLINIC | Age: 81
End: 2021-10-21

## 2021-10-21 NOTE — TELEPHONE ENCOUNTER
Pt called and LM that HR has been running in the 40's and usually runs in the  60's.  Pt currently taking Metoprolol tartrated 25 mg bid and was started in ER on 8/22. LM as to how pt is checking his HR. Gutierrez

## 2021-10-25 ENCOUNTER — OFFICE VISIT (OUTPATIENT)
Dept: CARDIOLOGY | Facility: CLINIC | Age: 81
End: 2021-10-25
Payer: COMMERCIAL

## 2021-10-25 VITALS
DIASTOLIC BLOOD PRESSURE: 88 MMHG | HEIGHT: 72 IN | WEIGHT: 205 LBS | HEART RATE: 50 BPM | SYSTOLIC BLOOD PRESSURE: 112 MMHG | BODY MASS INDEX: 27.77 KG/M2 | OXYGEN SATURATION: 100 %

## 2021-10-25 DIAGNOSIS — I47.29 PAROXYSMAL VENTRICULAR TACHYCARDIA (H): ICD-10-CM

## 2021-10-25 DIAGNOSIS — I48.21 PERMANENT ATRIAL FIBRILLATION (H): ICD-10-CM

## 2021-10-25 PROCEDURE — 99214 OFFICE O/P EST MOD 30 MIN: CPT | Performed by: PHYSICIAN ASSISTANT

## 2021-10-25 RX ORDER — METOPROLOL TARTRATE 25 MG/1
25 TABLET, FILM COATED ORAL 2 TIMES DAILY
Qty: 180 TABLET | Refills: 3 | Status: SHIPPED | OUTPATIENT
Start: 2021-10-25

## 2021-10-25 ASSESSMENT — MIFFLIN-ST. JEOR: SCORE: 1664.93

## 2021-10-25 NOTE — LETTER
"10/25/2021    [facility]  [address]  [facility] [address]    RE: Brandan Evans       Dear Colleague,    I had the pleasure of seeing Brandan Sierrargensen in the [facility].    [facility]    I had the pleasure of seeing Clayton when he and his wife Erick came for follow up of AFib and test results.  This 80 year old sees Dr. Melendez for his history of:    1. Permanent AFib; EF 47% - dating back to at least 2012. ER 8/2021 for high BP and noted to be in AFib with CVR.  CHADSVASc 3 (HTN, age)  2. HTN  3. Mild CM; 47% - noted on echo 8/2021. Of note, EF was normal 55-60% 11/2018 (ANW echo)  4. NSVT - noted on Holter 9/2021. Asx'c. Noted on Holter 9/2021      I met Clayton 9/2021 at which time we reviewed his Holter showing AFib with good HR control @ 63 bpm. He had some episodes of NSVT and noted some increasing SOB climbing stairs.  Due to VT and TORRES, I rec'd nuc stress test as well as increased losartan dosing d/t HTN.     Stress test 10/2021 was negative for ischemia.    Interval History:  Overall he's done well since I last saw him, with improved breathing. Erick still points out his LE edema, but they both agree it's some better. No c/o orthopnea, PND.    No c/o CP. No trouble with palpitations, dizziness or lightheadedness.     BPs at home have really been good since we increased the losartan to 50 mg daily, now running 110-140s, typically 120s. Earlier today was 112/88.  He's really pleased!     VITALS:  Vitals: BP (!) 156/100   Pulse 50   Ht 1.816 m (5' 11.5\")   Wt 93 kg (205 lb)   SpO2 100%   BMI 28.19 kg/m      Diagnostic Testing:  Nuc Stress Test 10/2021 no ischemia/infarction. EF 54%, mildly reduced. TID absent. LV cavity enlarged  24 hour Holter 9/2/2021 on metoprolol tartrate 25 mg BID showed AFib with avg HR 63 bpm (range 34--123 bpm). 4% PVCs. 5 beat run of NSVT @ 165 bpm  EKG 8/26/2021 on metoprolol tartrate " "25 mg BID showed AFib with CVR. LAFB  Echocardiogram 8/25/2021 - LVEF 47%. Nl RV. sev LA dilation, 1+MR, 1-2+TR with nl IVC       Plan:  6 m follow-up     Assessment/Plan:    1. HTN    Noted that ~1 week after increasing losartan to 50 mg daily, BPs started coming down and are now excellent (initially high here)    PLAN:    Continue current meds    2. Permanent AFib    Remains under good HR control with low dose metoprolol tartrate    Remains on warfarin     PLAN:    Continue current medication    Reviewed that we'd continue warfarin despite his \"stable AFib\" d/t CHADSVASc 3 (HTN, age)    See me or Dr. Melendez 6 months    3. CM, EF 47%    Remains on Lasix 40 mg daily with relatively good results    Remains on BB and ARB therapy    Stress test with dilated LV; on ARB    On exam, no crackles; has only 1+ swelling     PLAN:    Compression stockings     Continue to limit sodium in diet    4. TORRES    Improved on the Lasix 40 mg daily and increased losartan 50 mg daily for BP control    Stress test negative for ischemia and confirmed    On exam, no crackles; has only 1+ swelling     PLAN:    As improved, will make no changes      Teresita Yadav PA-C, MSPAS      Orders Placed This Encounter   Procedures     Basic metabolic panel     Follow-Up with Cardiac Advanced Practice Provider     Orders Placed This Encounter   Medications     metoprolol tartrate (LOPRESSOR) 25 MG tablet     Sig: Take 1 tablet (25 mg) by mouth 2 times daily     Dispense:  180 tablet     Refill:  3     Keep on file until pt is due     Medications Discontinued During This Encounter   Medication Reason     metoprolol tartrate (LOPRESSOR) 25 MG tablet Reorder         Encounter Diagnoses   Name Primary?     Permanent atrial fibrillation (H)      Paroxysmal ventricular tachycardia (H)        CURRENT MEDICATIONS:  Current Outpatient Medications   Medication Sig Dispense Refill     metoprolol tartrate (LOPRESSOR) 25 MG tablet Take 1 tablet (25 mg) by mouth 2 times " "daily 180 tablet 3     allopurinol (ZYLOPRIM) 100 MG tablet Take 100 mg by mouth daily       furosemide (LASIX) 40 MG tablet Take 40 mg by mouth daily       losartan (COZAAR) 50 MG tablet Take 1 tablet (50 mg) by mouth daily 30 tablet 5     warfarin ANTICOAGULANT (COUMADIN) 5 MG tablet Start with 1 tab daily, thereafter use as directed by INR Clinic 45 tablet 3       ALLERGIES     Allergies   Allergen Reactions     Bacitracin      Other reaction(s): Contact Dermatitis     Clindamycin      Other reaction(s): GI Upset     Soap Itching     Other reaction(s): Contact Dermatitis  blisters and itching from \"[employer] soap\" used for cleansing LEs 10/2006.     Triamcinolone      Can not use cream  Caused reddening         Review of Systems:  Skin:  Negative     Eyes:  Positive for glasses  ENT:  Negative    Respiratory:  Positive for dyspnea on exertion  Cardiovascular:  palpitations;chest pain;syncope or near-syncope;cyanosis;fatigue;lightheadedness;dizziness;Negative for Positive for;edema  Gastroenterology: Positive for hematochezia;melena  Genitourinary:  not assessed    Musculoskeletal:  Negative    Neurologic:  Negative    Psychiatric:  Negative    Heme/Lymph/Imm:  Negative    Endocrine:  Negative      Physical Exam:  Vitals: BP (!) 156/100   Pulse 50   Ht 1.816 m (5' 11.5\")   Wt 93 kg (205 lb)   SpO2 100%   BMI 28.19 kg/m      Constitutional:  cooperative, alert and oriented, well developed, well nourished, in no acute distress        Skin:  warm and dry to the touch, no apparent skin lesions or masses noted        Head:  normocephalic, no masses or lesions        Eyes:  pupils equal and round;conjunctivae and lids unremarkable;sclera white        ENT:  not assessed this visit        Neck:  JVP normal;no carotid bruit        Chest:  normal breath sounds, clear to auscultation, normal A-P diameter, normal symmetry, normal respiratory excursion, no use of accessory muscles        Cardiac:   irregular rhythm       "          Abdomen:  abdomen soft        Vascular: pulses full and equal                                      Extremities and Back:  no deformities, clubbing, cyanosis, erythema observed        Neurological:  no gross motor deficits            PAST MEDICAL HISTORY:  Past Medical History:   Diagnosis Date     Arrhythmia      Blood in semen      Complication of anesthesia [id number]    Patient said that jis pulse rate dropped during a surgical procedure     Decreased hearing     right ear.     Gout      Hypertension      Irregular heart beat      Renal disease     kidney stones       PAST SURGICAL HISTORY:  Past Surgical History:   Procedure Laterality Date     CHOLECYSTECTOMY       COLONOSCOPY       COLONOSCOPY N/A 5/22/2018    Procedure: COMBINED COLONOSCOPY, SINGLE OR MULTIPLE BIOPSY/POLYPECTOMY BY BIOPSY;  colonoscopy;  Surgeon: Hugh Pitt MD;  Location:  GI     EYE SURGERY      bilat catartact     LASER DIODE STAPEDECTOMY  4/11/2012    Procedure:LASER DIODE STAPEDECTOMY; Attempted Diode Laser Right Stapedectomy with laser standby **latex safe ; Surgeon:MECCA DAMON; Location: OR     LASER DIODE STAPEDECTOMY  5/23/2012    Procedure:LASER DIODE STAPEDECTOMY; Right Stapedectomy with Diode Laser  Latex Safe; Surgeon:MECCA DAMON; Location: OR       FAMILY HISTORY:  History reviewed. No pertinent family history.    SOCIAL HISTORY:  Social History     Socioeconomic History     Marital status:      Spouse name: None     Number of children: None     Years of education: None     Highest education level: None   Occupational History     None   Tobacco Use     Smoking status: Never Smoker     Smokeless tobacco: Never Used   Substance and Sexual Activity     Alcohol use: Not Currently     Drug use: No     Sexual activity: None   Other Topics Concern     Parent/sibling w/ CABG, MI or angioplasty before 65F 55M? Not Asked   Social History Narrative     None     Social Determinants of  Health     Financial Resource Strain:      Difficulty of Paying Living Expenses:    Food Insecurity:      Worried About Running Out of Food in the Last Year:      Ran Out of Food in the Last Year:    Transportation Needs:      Lack of Transportation (Medical):      Lack of Transportation (Non-Medical):    Physical Activity:      Days of Exercise per Week:      Minutes of Exercise per Session:    Stress:      Feeling of Stress :    Social Connections:      Frequency of Communication with Friends and Family:      Frequency of Social Gatherings with Friends and Family:      Attends Nondenominational Services:      Active Member of Clubs or Organizations:      Attends Club or Organization Meetings:      Marital Status:    Intimate Partner Violence:      Fear of Current or Ex-Partner:      Emotionally Abused:      Physically Abused:      Sexually Abused:            Thank you for allowing me to participate in the care of your patient.      Sincerely,     Sherita Yadav PA-C     [facility]  cc:   Sherita Yadav PA-C  [address]  [place] [address]

## 2021-10-25 NOTE — PATIENT INSTRUCTIONS
Clayton - it was nice to see and Erick today!    1. Reviewed the stress test showing no evidence of blockages causing trouble with lack of blood flow, which is good news!  2. Notes breathing was better since I saw you   3. BPs have really looked good since increasing the losartan 50 mg twice a day    PLAN:  1. Stay on warfarin/Coumadin as risk score for stroke is still high despite the permanent AFib  2. Continue to wear compression stockings, limit the sodium in diet and elevate legs. Use the Lasix 40 mg daily in the morning.  3. See us back in ~6 months with non-fasting blood work and . CALL if issues sooner! [phone]

## 2021-11-10 ENCOUNTER — TELEPHONE (OUTPATIENT)
Dept: CARDIOLOGY | Facility: CLINIC | Age: 81
End: 2021-11-10
Payer: COMMERCIAL

## 2021-11-10 NOTE — TELEPHONE ENCOUNTER
11/10/21 Pts wife Erick called and left message that they are almost out of Warfarin and need a refill. Wife seemed very upset about leaving a message. Attempted to call them back, reached LUZ , ARIN and instructed her to call INR RN for refill.  Kalina 325 pm

## 2021-11-11 NOTE — TELEPHONE ENCOUNTER
Patient left message on EP nurse line requesting refill for warfarin.  Called patient and left detailed message that we do not manage his INR's and provided clinic number to call to get assistance.  Provided our number as well if he had any further questions.  CORONA Morrison

## 2022-02-19 ENCOUNTER — HEALTH MAINTENANCE LETTER (OUTPATIENT)
Age: 82
End: 2022-02-19

## 2022-05-10 ENCOUNTER — OFFICE VISIT (OUTPATIENT)
Dept: UROLOGY | Facility: CLINIC | Age: 82
End: 2022-05-10
Payer: COMMERCIAL

## 2022-05-10 VITALS
SYSTOLIC BLOOD PRESSURE: 126 MMHG | BODY MASS INDEX: 28.7 KG/M2 | WEIGHT: 205 LBS | HEART RATE: 75 BPM | DIASTOLIC BLOOD PRESSURE: 70 MMHG | HEIGHT: 71 IN

## 2022-05-10 DIAGNOSIS — N40.0 ENLARGED PROSTATE: Primary | ICD-10-CM

## 2022-05-10 LAB
ALBUMIN UR-MCNC: NEGATIVE MG/DL
APPEARANCE UR: CLEAR
BILIRUB UR QL STRIP: NEGATIVE
COLOR UR AUTO: YELLOW
GLUCOSE UR STRIP-MCNC: NEGATIVE MG/DL
HGB UR QL STRIP: NEGATIVE
KETONES UR STRIP-MCNC: NEGATIVE MG/DL
LEUKOCYTE ESTERASE UR QL STRIP: NEGATIVE
NITRATE UR QL: NEGATIVE
PH UR STRIP: 7 [PH] (ref 5–7)
SP GR UR STRIP: 1.01 (ref 1–1.03)
UROBILINOGEN UR STRIP-ACNC: 0.2 E.U./DL

## 2022-05-10 PROCEDURE — 99213 OFFICE O/P EST LOW 20 MIN: CPT | Performed by: UROLOGY

## 2022-05-10 PROCEDURE — 81003 URINALYSIS AUTO W/O SCOPE: CPT | Mod: QW | Performed by: UROLOGY

## 2022-05-10 ASSESSMENT — PAIN SCALES - GENERAL: PAINLEVEL: MILD PAIN (3)

## 2022-05-10 NOTE — NURSING NOTE
Chief Complaint   Patient presents with     Prostate pain     Elevated PSA     UA,LEYDA     Dulce Dumont

## 2022-05-10 NOTE — LETTER
5/10/2022       RE: Brandan Evans  [address] [facility] [address]     Dear Colleague,    Thank you for referring your patient, Brandan Evans, to the [facility] at [facility]. Please see a copy of my visit note below.    Office Visit Note  [facility]  [phone]    UROLOGIC DIAGNOSES:   Enlarged prostate    CURRENT INTERVENTIONS:       HISTORY:   This is an 81-year-old gentleman who had an elevated PSA in 2014 and had an MRI of the prostate that showed a 90 g prostate with no areas concerning for prostate cancer.  He appropriately decided to stop PSA screening at that time.  Since 2017 he has seen Dr. Baumann on an annual basis for digital rectal examination.  He has no urinary symptoms or complaints at this time.      PAST MEDICAL HISTORY:   Past Medical History:   Diagnosis Date     Arrhythmia      Blood in semen      Complication of anesthesia [id number]    Patient said that jis pulse rate dropped during a surgical procedure     Decreased hearing     right ear.     Gout      Hypertension      Irregular heart beat      Renal disease     kidney stones       PAST SURGICAL HISTORY:   Past Surgical History:   Procedure Laterality Date     CHOLECYSTECTOMY       COLONOSCOPY       COLONOSCOPY N/A 5/22/2018    Procedure: COMBINED COLONOSCOPY, SINGLE OR MULTIPLE BIOPSY/POLYPECTOMY BY BIOPSY;  colonoscopy;  Surgeon: Hugh Pitt MD;  Location:  GI     EYE SURGERY      bilat catartact     LASER DIODE STAPEDECTOMY  4/11/2012    Procedure:LASER DIODE STAPEDECTOMY; Attempted Diode Laser Right Stapedectomy with laser standby **latex safe ; Surgeon:MECCA DAMON; Location: OR     LASER DIODE STAPEDECTOMY  5/23/2012    Procedure:LASER DIODE STAPEDECTOMY; Right Stapedectomy with Diode Laser  Latex Safe; Surgeon:MECCA DAMON; Location: OR       FAMILY HISTORY: No family  history on file.    SOCIAL HISTORY:   Social History     Socioeconomic History     Marital status:    Tobacco Use     Smoking status: Never Smoker     Smokeless tobacco: Never Used   Substance and Sexual Activity     Alcohol use: Not Currently     Drug use: No       Review Of Systems:  Skin: No rash, pruritis, or skin pigmentation  Eyes: No changes in vision  Ears/Nose/Throat: No changes in hearing, no nosebleeds  Respiratory: No shortness of breath, dyspnea on exertion, cough, or hemoptysis  Cardiovascular: No chest pain or palpitations  Gastrointestinal: No diarrhea or constipation. No abdominal pain. No hematochezia  Genitourinary: see HPI  Musculoskeletal: No pain or swelling of joints, normal range of motion  Neurologic: No weakness or tremors  Psychiatric: No recent changes in memory or mood  Hematologic/Lymphatic/Immunologic: No easy bruising or enlarged lymph nodes  Endocrine: No weight gain or loss      PHYSICAL EXAM:    There were no vitals taken for this visit.    Constitutional: Well developed. Conversant and in no acute distress  Eyes: Anicteric sclera, conjunctiva clear, normal extraocular movements  ENT: Normocephalic and atraumatic,   Skin: Warm and dry. No rashes or lesions  Cardiac: No peripheral edema  Back/Flank: Not done  CNS/PNS: Normal musculature and movements, moves all extremities normally  Respiratory: Normal non-labored breathing  Abdomen: Soft nontender and nondistended  Peripheral Vascular: No peripheral edema  Mental Status/Psych: Alert and Oriented x 3. Normal mood and affect    Penis: Not done  Scrotal Skin: Not done  Testicles: Not done  Epididymis: Not done  Digital Rectal Exam:     Cystoscopy: Not done    Imaging: None    Urinalysis: UA RESULTS:  Recent Labs   Lab Test 04/23/21  0838   COLOR Yellow   APPEARANCE Clear   URINEGLC Negative   URINEBILI Negative   URINEKETONE Negative   SG 1.025   UBLD Negative   URINEPH 6.0   PROTEIN Negative   UROBILINOGEN 0.2   NITRITE  Negative   LEUKEST Negative       PSA:     Post Void Residual:     Other labs: None today      IMPRESSION:  Enlarged prostate    PLAN:  He appropriately chose to stop prostate cancer screening in 2014 and has not had his PSA checked since that time.  He has an enlarged prostate but no symptoms from this.  We discussed general recommendations for prostate cancer screening.  I agree that he does not require any further prostate cancer screening, and this would include omitting digital rectal examination.  He agrees to this.    If he develops any urinary symptoms in the future he should come back and see me at that time.      Miguel Angel Chauhan M.D.

## 2022-05-10 NOTE — PROGRESS NOTES
Office Visit Note  [facility]  [phone]    UROLOGIC DIAGNOSES:   Enlarged prostate    CURRENT INTERVENTIONS:       HISTORY:   This is an 81-year-old gentleman who had an elevated PSA in 2014 and had an MRI of the prostate that showed a 90 g prostate with no areas concerning for prostate cancer.  He appropriately decided to stop PSA screening at that time.  Since 2017 he has seen Dr. Baumann on an annual basis for digital rectal examination.  He has no urinary symptoms or complaints at this time.      PAST MEDICAL HISTORY:   Past Medical History:   Diagnosis Date     Arrhythmia      Blood in semen      Complication of anesthesia [id number]    Patient said that jis pulse rate dropped during a surgical procedure     Decreased hearing     right ear.     Gout      Hypertension      Irregular heart beat      Renal disease     kidney stones       PAST SURGICAL HISTORY:   Past Surgical History:   Procedure Laterality Date     CHOLECYSTECTOMY       COLONOSCOPY       COLONOSCOPY N/A 5/22/2018    Procedure: COMBINED COLONOSCOPY, SINGLE OR MULTIPLE BIOPSY/POLYPECTOMY BY BIOPSY;  colonoscopy;  Surgeon: Hugh Pitt MD;  Location:  GI     EYE SURGERY      bilat catartact     LASER DIODE STAPEDECTOMY  4/11/2012    Procedure:LASER DIODE STAPEDECTOMY; Attempted Diode Laser Right Stapedectomy with laser standby **latex safe ; Surgeon:MECCA DAMON; Location: OR     LASER DIODE STAPEDECTOMY  5/23/2012    Procedure:LASER DIODE STAPEDECTOMY; Right Stapedectomy with Diode Laser  Latex Safe; Surgeon:MECCA DAMON; Location: OR       FAMILY HISTORY: No family history on file.    SOCIAL HISTORY:   Social History     Socioeconomic History     Marital status:    Tobacco Use     Smoking status: Never Smoker     Smokeless tobacco: Never Used   Substance and Sexual Activity     Alcohol use: Not Currently     Drug use: No       Review Of Systems:  Skin: No rash, pruritis, or skin  pigmentation  Eyes: No changes in vision  Ears/Nose/Throat: No changes in hearing, no nosebleeds  Respiratory: No shortness of breath, dyspnea on exertion, cough, or hemoptysis  Cardiovascular: No chest pain or palpitations  Gastrointestinal: No diarrhea or constipation. No abdominal pain. No hematochezia  Genitourinary: see HPI  Musculoskeletal: No pain or swelling of joints, normal range of motion  Neurologic: No weakness or tremors  Psychiatric: No recent changes in memory or mood  Hematologic/Lymphatic/Immunologic: No easy bruising or enlarged lymph nodes  Endocrine: No weight gain or loss      PHYSICAL EXAM:    There were no vitals taken for this visit.    Constitutional: Well developed. Conversant and in no acute distress  Eyes: Anicteric sclera, conjunctiva clear, normal extraocular movements  ENT: Normocephalic and atraumatic,   Skin: Warm and dry. No rashes or lesions  Cardiac: No peripheral edema  Back/Flank: Not done  CNS/PNS: Normal musculature and movements, moves all extremities normally  Respiratory: Normal non-labored breathing  Abdomen: Soft nontender and nondistended  Peripheral Vascular: No peripheral edema  Mental Status/Psych: Alert and Oriented x 3. Normal mood and affect    Penis: Not done  Scrotal Skin: Not done  Testicles: Not done  Epididymis: Not done  Digital Rectal Exam:     Cystoscopy: Not done    Imaging: None    Urinalysis: UA RESULTS:  Recent Labs   Lab Test 04/23/21  0838   COLOR Yellow   APPEARANCE Clear   URINEGLC Negative   URINEBILI Negative   URINEKETONE Negative   SG 1.025   UBLD Negative   URINEPH 6.0   PROTEIN Negative   UROBILINOGEN 0.2   NITRITE Negative   LEUKEST Negative       PSA:     Post Void Residual:     Other labs: None today      IMPRESSION:  Enlarged prostate    PLAN:  He appropriately chose to stop prostate cancer screening in 2014 and has not had his PSA checked since that time.  He has an enlarged prostate but no symptoms from this.  We discussed general  recommendations for prostate cancer screening.  I agree that he does not require any further prostate cancer screening, and this would include omitting digital rectal examination.  He agrees to this.    If he develops any urinary symptoms in the future he should come back and see me at that time.      Miguel Angel Chauhan M.D.

## 2022-10-22 ENCOUNTER — HEALTH MAINTENANCE LETTER (OUTPATIENT)
Age: 82
End: 2022-10-22

## 2023-06-01 ENCOUNTER — HEALTH MAINTENANCE LETTER (OUTPATIENT)
Age: 83
End: 2023-06-01

## 2024-06-02 ENCOUNTER — HEALTH MAINTENANCE LETTER (OUTPATIENT)
Age: 84
End: 2024-06-02

## (undated) RX ORDER — FENTANYL CITRATE 50 UG/ML
INJECTION, SOLUTION INTRAMUSCULAR; INTRAVENOUS
Status: DISPENSED
Start: 2018-05-22

## (undated) RX ORDER — REGADENOSON 0.08 MG/ML
INJECTION, SOLUTION INTRAVENOUS
Status: DISPENSED
Start: 2021-10-06